# Patient Record
Sex: FEMALE | Race: WHITE | HISPANIC OR LATINO | Employment: OTHER | URBAN - METROPOLITAN AREA
[De-identification: names, ages, dates, MRNs, and addresses within clinical notes are randomized per-mention and may not be internally consistent; named-entity substitution may affect disease eponyms.]

---

## 2021-03-10 ENCOUNTER — APPOINTMENT (EMERGENCY)
Dept: CT IMAGING | Facility: HOSPITAL | Age: 36
End: 2021-03-10
Payer: COMMERCIAL

## 2021-03-10 ENCOUNTER — HOSPITAL ENCOUNTER (OUTPATIENT)
Facility: HOSPITAL | Age: 36
Setting detail: OBSERVATION
Discharge: HOME | End: 2021-03-11
Attending: EMERGENCY MEDICINE | Admitting: INTERNAL MEDICINE
Payer: COMMERCIAL

## 2021-03-10 DIAGNOSIS — R76.8 HEPATITIS C ANTIBODY TEST POSITIVE: ICD-10-CM

## 2021-03-10 DIAGNOSIS — R17 JAUNDICE: ICD-10-CM

## 2021-03-10 DIAGNOSIS — R74.01 TRANSAMINITIS: Primary | ICD-10-CM

## 2021-03-10 DIAGNOSIS — B15.9 ACUTE HEPATITIS A: ICD-10-CM

## 2021-03-10 DIAGNOSIS — F19.90 DRUG USE: ICD-10-CM

## 2021-03-10 DIAGNOSIS — K75.9 HEPATITIS: ICD-10-CM

## 2021-03-10 LAB
ALBUMIN SERPL BCP-MCNC: 4 G/DL (ref 3–5.2)
ALP SERPL-CCNC: 251 U/L (ref 43–122)
ALT SERPL W P-5'-P-CCNC: 985 U/L (ref 9–52)
AMPHETAMINES SERPL QL SCN: NEGATIVE
ANION GAP SERPL CALCULATED.3IONS-SCNC: 7 MMOL/L (ref 5–14)
APAP SERPL-MCNC: <10 UG/ML (ref 10–20)
APTT PPP: 35 SECONDS (ref 23–37)
AST SERPL W P-5'-P-CCNC: 798 U/L (ref 14–36)
BARBITURATES UR QL: NEGATIVE
BASOPHILS # BLD AUTO: 0.1 THOUSANDS/ΜL (ref 0–0.1)
BASOPHILS NFR BLD AUTO: 1 % (ref 0–1)
BENZODIAZ UR QL: NEGATIVE
BILIRUB SERPL-MCNC: 5.1 MG/DL
BILIRUB UR QL STRIP: NEGATIVE
BUN SERPL-MCNC: 10 MG/DL (ref 5–25)
CALCIUM SERPL-MCNC: 8.8 MG/DL (ref 8.4–10.2)
CHLORIDE SERPL-SCNC: 95 MMOL/L (ref 97–108)
CK SERPL-CCNC: <20 U/L (ref 30–135)
CLARITY UR: CLEAR
CO2 SERPL-SCNC: 34 MMOL/L (ref 22–30)
COCAINE UR QL: NEGATIVE
COLOR UR: ABNORMAL
CREAT SERPL-MCNC: 0.88 MG/DL (ref 0.6–1.2)
D DIMER PPP FEU-MCNC: 2.36 UG/ML FEU
EOSINOPHIL # BLD AUTO: 0.1 THOUSAND/ΜL (ref 0–0.4)
EOSINOPHIL NFR BLD AUTO: 1 % (ref 0–6)
ERYTHROCYTE [DISTWIDTH] IN BLOOD BY AUTOMATED COUNT: 14.4 %
EXT PREG TEST URINE: NEGATIVE
EXT. CONTROL ED NAV: NORMAL
GFR SERPL CREATININE-BSD FRML MDRD: 84 ML/MIN/1.73SQ M
GLUCOSE SERPL-MCNC: 112 MG/DL (ref 70–99)
GLUCOSE UR STRIP-MCNC: NEGATIVE MG/DL
HCT VFR BLD AUTO: 40.2 % (ref 36–46)
HGB BLD-MCNC: 13.2 G/DL (ref 12–16)
HGB UR QL STRIP.AUTO: NEGATIVE
INR PPP: 1.12 (ref 0.84–1.19)
KETONES UR STRIP-MCNC: NEGATIVE MG/DL
LEUKOCYTE ESTERASE UR QL STRIP: NEGATIVE
LIPASE SERPL-CCNC: 410 U/L (ref 23–300)
LYMPHOCYTES # BLD AUTO: 2.3 THOUSANDS/ΜL (ref 0.5–4)
LYMPHOCYTES NFR BLD AUTO: 30 % (ref 25–45)
MAGNESIUM SERPL-MCNC: 2.4 MG/DL (ref 1.6–2.3)
MCH RBC QN AUTO: 27.9 PG (ref 26–34)
MCHC RBC AUTO-ENTMCNC: 32.7 G/DL (ref 31–36)
MCV RBC AUTO: 85 FL (ref 80–100)
METHADONE UR QL: NEGATIVE
MONOCYTES # BLD AUTO: 0.6 THOUSAND/ΜL (ref 0.2–0.9)
MONOCYTES NFR BLD AUTO: 8 % (ref 1–10)
NEUTROPHILS # BLD AUTO: 4.5 THOUSANDS/ΜL (ref 1.8–7.8)
NEUTS SEG NFR BLD AUTO: 59 % (ref 45–65)
NITRITE UR QL STRIP: NEGATIVE
OPIATES UR QL SCN: NEGATIVE
OXYCODONE+OXYMORPHONE UR QL SCN: NEGATIVE
PCP UR QL: NEGATIVE
PH UR STRIP.AUTO: 7 [PH]
PHOSPHATE SERPL-MCNC: 3.4 MG/DL (ref 2.5–4.8)
PLATELET # BLD AUTO: 371 THOUSANDS/UL (ref 150–450)
PMV BLD AUTO: 9.3 FL (ref 8.9–12.7)
POTASSIUM SERPL-SCNC: 3.8 MMOL/L (ref 3.6–5)
PROT SERPL-MCNC: 8.8 G/DL (ref 5.9–8.4)
PROT UR STRIP-MCNC: NEGATIVE MG/DL
PROTHROMBIN TIME: 14.5 SECONDS (ref 11.6–14.5)
RBC # BLD AUTO: 4.72 MILLION/UL (ref 4–5.2)
SODIUM SERPL-SCNC: 136 MMOL/L (ref 137–147)
SP GR UR STRIP.AUTO: 1.01 (ref 1–1.04)
THC UR QL: NEGATIVE
UROBILINOGEN UA: 1 MG/DL
WBC # BLD AUTO: 7.6 THOUSAND/UL (ref 4.5–11)

## 2021-03-10 PROCEDURE — 71275 CT ANGIOGRAPHY CHEST: CPT

## 2021-03-10 PROCEDURE — 99220 PR INITIAL OBSERVATION CARE/DAY 70 MINUTES: CPT | Performed by: INTERNAL MEDICINE

## 2021-03-10 PROCEDURE — 84100 ASSAY OF PHOSPHORUS: CPT | Performed by: EMERGENCY MEDICINE

## 2021-03-10 PROCEDURE — 85025 COMPLETE CBC W/AUTO DIFF WBC: CPT | Performed by: EMERGENCY MEDICINE

## 2021-03-10 PROCEDURE — 85730 THROMBOPLASTIN TIME PARTIAL: CPT | Performed by: EMERGENCY MEDICINE

## 2021-03-10 PROCEDURE — 74177 CT ABD & PELVIS W/CONTRAST: CPT

## 2021-03-10 PROCEDURE — 81025 URINE PREGNANCY TEST: CPT

## 2021-03-10 PROCEDURE — 85379 FIBRIN DEGRADATION QUANT: CPT | Performed by: EMERGENCY MEDICINE

## 2021-03-10 PROCEDURE — 80143 DRUG ASSAY ACETAMINOPHEN: CPT | Performed by: EMERGENCY MEDICINE

## 2021-03-10 PROCEDURE — 83735 ASSAY OF MAGNESIUM: CPT | Performed by: EMERGENCY MEDICINE

## 2021-03-10 PROCEDURE — 36415 COLL VENOUS BLD VENIPUNCTURE: CPT | Performed by: EMERGENCY MEDICINE

## 2021-03-10 PROCEDURE — 80053 COMPREHEN METABOLIC PANEL: CPT | Performed by: EMERGENCY MEDICINE

## 2021-03-10 PROCEDURE — 80074 ACUTE HEPATITIS PANEL: CPT | Performed by: EMERGENCY MEDICINE

## 2021-03-10 PROCEDURE — 96365 THER/PROPH/DIAG IV INF INIT: CPT

## 2021-03-10 PROCEDURE — 96366 THER/PROPH/DIAG IV INF ADDON: CPT

## 2021-03-10 PROCEDURE — 85610 PROTHROMBIN TIME: CPT | Performed by: EMERGENCY MEDICINE

## 2021-03-10 PROCEDURE — 80307 DRUG TEST PRSMV CHEM ANLYZR: CPT | Performed by: EMERGENCY MEDICINE

## 2021-03-10 PROCEDURE — G1004 CDSM NDSC: HCPCS

## 2021-03-10 PROCEDURE — 81003 URINALYSIS AUTO W/O SCOPE: CPT | Performed by: EMERGENCY MEDICINE

## 2021-03-10 PROCEDURE — 82550 ASSAY OF CK (CPK): CPT | Performed by: EMERGENCY MEDICINE

## 2021-03-10 PROCEDURE — 96375 TX/PRO/DX INJ NEW DRUG ADDON: CPT

## 2021-03-10 PROCEDURE — 99285 EMERGENCY DEPT VISIT HI MDM: CPT

## 2021-03-10 PROCEDURE — 99284 EMERGENCY DEPT VISIT MOD MDM: CPT | Performed by: EMERGENCY MEDICINE

## 2021-03-10 PROCEDURE — 83690 ASSAY OF LIPASE: CPT | Performed by: EMERGENCY MEDICINE

## 2021-03-10 RX ORDER — MAGNESIUM HYDROXIDE/ALUMINUM HYDROXICE/SIMETHICONE 120; 1200; 1200 MG/30ML; MG/30ML; MG/30ML
30 SUSPENSION ORAL EVERY 6 HOURS PRN
Status: DISCONTINUED | OUTPATIENT
Start: 2021-03-10 | End: 2021-03-11 | Stop reason: HOSPADM

## 2021-03-10 RX ORDER — ALBUTEROL SULFATE 90 UG/1
2 AEROSOL, METERED RESPIRATORY (INHALATION) EVERY 4 HOURS PRN
Status: DISCONTINUED | OUTPATIENT
Start: 2021-03-10 | End: 2021-03-11 | Stop reason: HOSPADM

## 2021-03-10 RX ORDER — KETOROLAC TROMETHAMINE 30 MG/ML
15 INJECTION, SOLUTION INTRAMUSCULAR; INTRAVENOUS ONCE
Status: COMPLETED | OUTPATIENT
Start: 2021-03-10 | End: 2021-03-10

## 2021-03-10 RX ORDER — ONDANSETRON 2 MG/ML
4 INJECTION INTRAMUSCULAR; INTRAVENOUS EVERY 6 HOURS PRN
Status: DISCONTINUED | OUTPATIENT
Start: 2021-03-10 | End: 2021-03-11 | Stop reason: HOSPADM

## 2021-03-10 RX ORDER — CLONAZEPAM 1 MG/1
1 TABLET ORAL DAILY
Status: DISCONTINUED | OUTPATIENT
Start: 2021-03-10 | End: 2021-03-11 | Stop reason: HOSPADM

## 2021-03-10 RX ORDER — CLONIDINE HYDROCHLORIDE 0.1 MG/1
0.2 TABLET ORAL 3 TIMES DAILY PRN
Status: DISCONTINUED | OUTPATIENT
Start: 2021-03-10 | End: 2021-03-11 | Stop reason: HOSPADM

## 2021-03-10 RX ORDER — ZOLPIDEM TARTRATE 6.25 MG/1
6.25 TABLET, FILM COATED, EXTENDED RELEASE ORAL
COMMUNITY
End: 2021-03-25

## 2021-03-10 RX ORDER — CLONAZEPAM 1 MG/1
1 TABLET ORAL DAILY
COMMUNITY

## 2021-03-10 RX ORDER — ZOLPIDEM TARTRATE 5 MG/1
5 TABLET ORAL
Status: DISCONTINUED | OUTPATIENT
Start: 2021-03-10 | End: 2021-03-11 | Stop reason: HOSPADM

## 2021-03-10 RX ORDER — METHADONE HYDROCHLORIDE 10 MG/1
40 TABLET ORAL DAILY
Status: DISCONTINUED | OUTPATIENT
Start: 2021-03-10 | End: 2021-03-11 | Stop reason: HOSPADM

## 2021-03-10 RX ORDER — ACETAMINOPHEN 325 MG/1
650 TABLET ORAL EVERY 6 HOURS PRN
Status: DISCONTINUED | OUTPATIENT
Start: 2021-03-10 | End: 2021-03-11 | Stop reason: HOSPADM

## 2021-03-10 RX ADMIN — CLONAZEPAM 1 MG: 1 TABLET ORAL at 16:35

## 2021-03-10 RX ADMIN — IOHEXOL 100 ML: 350 INJECTION, SOLUTION INTRAVENOUS at 12:22

## 2021-03-10 RX ADMIN — SODIUM CHLORIDE, SODIUM LACTATE, POTASSIUM CHLORIDE, AND CALCIUM CHLORIDE 1000 ML: .6; .31; .03; .02 INJECTION, SOLUTION INTRAVENOUS at 10:33

## 2021-03-10 RX ADMIN — KETOROLAC TROMETHAMINE 15 MG: 30 INJECTION, SOLUTION INTRAMUSCULAR; INTRAVENOUS at 11:26

## 2021-03-10 RX ADMIN — ZOLPIDEM TARTRATE 5 MG: 5 TABLET, FILM COATED ORAL at 21:34

## 2021-03-10 RX ADMIN — METHADONE HYDROCHLORIDE 40 MG: 10 TABLET ORAL at 16:34

## 2021-03-10 NOTE — ASSESSMENT & PLAN NOTE
· Suspect viral hepatitis given IV drug abuse   · Scleral icterus noted on exam  · Transaminitis in the 700-900  · Total bilirubin 5  · Right upper quadrant tenderness on exam  · CT scan Mild intrahepatic bile duct prominence likely secondary to cholecystectomy no evidence of CBD stone  · Consult gastroenterology  · Follow-up viral hepatitis panel  · Check hepatic function with BMP in the morning

## 2021-03-10 NOTE — H&P
[facility]  H&P- Afsaneh Martin 1985, 38 y o  female MRN: [id number]  Unit/Bed#: 7T [facility] 708-02 Encounter: [id number]  Primary Care Provider: No primary care provider on file  Date and time admitted to hospital: 3/10/2021 10:18 AM    * Jaundice  Assessment & Plan  · Suspect viral hepatitis given IV drug abuse   · Scleral icterus noted on exam  · Transaminitis in the 700-900  · Total bilirubin 5  · Right upper quadrant tenderness on exam  · CT scan Mild intrahepatic bile duct prominence likely secondary to cholecystectomy no evidence of CBD stone  · Consult gastroenterology  · Follow-up viral hepatitis panel  · Check hepatic function with BMP in the morning      Heroin abuse ([facility] 75 )  Assessment & Plan  · Reported using 11 bags of heroin IV last use this morning  She reported using clean needles only her  needles  She mix it with fentanyl she was clean for the past 13 years until 1 year ago  She requested methadone add as it help in the past   · Start methadone 40 mg for opioid withdrawal  · Clonidine for withdrawal symptoms  · Offered inpatient detox declined  · Counseling for drug cessation    Asthma  Assessment & Plan  Albuterol as needed for dyspnea or wheezing  Von Willebrand disease ([facility] 75 )  Assessment & Plan  Avoid DVT prophylaxis with pharmacologic therapy  Avoid NSAIDs such as ibuprofen  Anxiety  Assessment & Plan  Resume pre-hospital Klonopin  VTE Prophylaxis: Pharmacologic VTE Prophylaxis contraindicated due to VWD  / sequential compression device   Code Status: Full   POLST: POLST form is not discussed and not completed at this time  Discussion with family:  Discussed with patient  Anticipated Length of Stay:  Patient will be admitted on an Observation basis with an anticipated length of stay of  Less than 2 midnights  Justification for Hospital Stay: Jaundice    Total Time for Visit, including Counseling / Coordination of Care: 45 minutes    Greater than 50% of this total time spent on direct patient counseling and coordination of care  Chief Complaint:   Yellowish eyes and skin    History of Present Illness:    Verner Martinis is a 38 y o  female unemployed, adopted who presents with past medical history of IV heroin abuse, von Willebrand's disease, asthma and anxiety complaining of yellowish discoloration of her eyes and skin for the past 2 days  She has been feeling weak for the past week associated with decreased appetite, nausea without vomiting  She noticed her urine very dark yellow  For the past 2 days her skin was getting yellow noted by her finance a as well as her eyes associated with sharp pain bilateral flanks upon taking deep breath  She has been injecting heroin however uses clean needles only does not share needles  She inject about 11 packs prior to admission mixed with fentanyl  She has been free from drugs for 13 years until 2 years ago  She lives with her finance a who has hepatitis C last sexual intercourse was a month ago  She is monogamous in her relationship  Upon arrival to the ED her labs show significant transaminitis AST in the 700 and ALT in the 900 with alk-phos in 250s and total prolonged been in the 5  CT chest abdomen pelvis PE study was negative for PE, concern for colitis, L5-S1 disc herniation and 1 cm low-density lesion in the right uterus recommended pelvic ultrasound non emergent  Review of Systems:    Review of Systems   Constitutional: Positive for appetite change and fatigue  Negative for chills and fever  HENT: Negative for rhinorrhea and sore throat  Eyes: Negative for pain and visual disturbance  Respiratory: Negative for cough and shortness of breath  Cardiovascular: Negative for chest pain and palpitations  Gastrointestinal: Negative for abdominal pain, nausea and vomiting  Genitourinary: Negative for difficulty urinating and dysuria     Musculoskeletal: Negative for back pain and joint swelling  Skin: Positive for color change (Yellowish)  Negative for rash  Neurological: Negative for numbness and headaches  Psychiatric/Behavioral: Negative for confusion and hallucinations  All other systems reviewed and are negative  Past Medical and Surgical History:     Past Medical History:   Diagnosis Date    Anxiety     Asthma     [facility]venous [facility] abuse ([email or web address]     Von Willebrand disease ([email or web address] 75 )        Past Surgical History:   Procedure Laterality Date    APPENDECTOMY      CHOLECYSTECTOMY      PARTIAL HYSTERECTOMY         Meds/Allergies:    Prior to Admission medications    Medication Sig Start Date End Date Taking? Authorizing Provider   clonazePAM (KlonoPIN) 1 mg tablet Take 1 mg by mouth daily    Yes Historical Provider, MD   zolpidem (AMBIEN CR) 6 25 MG CR tablet Take 6 25 mg by mouth daily at bedtime as needed for sleep   Yes Historical Provider, MD     I have reviewed home medications with patient personally  Allergies: No Known Allergies    Social History:     Marital Status: Single   Occupation:  Unemployed on disability  Patient Pre-hospital Living Situation:  In apartment with finance a  Patient Pre-hospital Level of Mobility:  Independent  Patient Pre-hospital Diet Restrictions:  No restrictions    Substance Use History:   Social History     Substance and Sexual Activity   Alcohol Use Never    Frequency: Never     Social History     Tobacco Use   Smoking Status Never Smoker   Smokeless Tobacco Never Used     Social History     Substance and Sexual Activity   Drug Use Yes    Types: Fentanyl, Heroin    Comment: "shoots 11 bags a day"       Family History:    non-contributory    Physical Exam:     Vitals:   Blood Pressure: 101/77 (03/10/21 1511)  Pulse: 83 (03/10/21 1511)  Temperature: (!) 96 8 °F (36 °C) (03/10/21 1511)  Temp Source: Skin (03/10/21 1511)  Respirations: 16 (03/10/21 1511)  Height: 5' 2" (157 5 cm) (03/10/21 1511)  Weight - Scale: 77 2 kg (170 lb 3 1 oz) (03/10/21 1511)  SpO2: 98 % (03/10/21 1511)    Physical Exam  Vitals signs reviewed  Constitutional:       General: She is not in acute distress  Appearance: She is obese  She is not ill-appearing, toxic-appearing or diaphoretic  HENT:      Head: Normocephalic and atraumatic  Nose: No rhinorrhea  Mouth/Throat:      Pharynx: Oropharynx is clear  No oropharyngeal exudate or posterior oropharyngeal erythema  Eyes:      General: Scleral icterus present  Right eye: No discharge  Left eye: No discharge  Neck:      Musculoskeletal: Neck supple  No neck rigidity  Cardiovascular:      Rate and Rhythm: Normal rate and regular rhythm  Heart sounds: Normal heart sounds  No murmur  Pulmonary:      Effort: Pulmonary effort is normal  No respiratory distress  Breath sounds: Normal breath sounds  No wheezing or rales  Abdominal:      General: Bowel sounds are normal  There is no distension  Palpations: Abdomen is soft  Tenderness: There is abdominal tenderness (RUQ)  Musculoskeletal:      Right lower leg: No edema  Left lower leg: No edema  Skin:     General: Skin is dry  Coloration: Skin is jaundiced  Neurological:      General: No focal deficit present  Mental Status: She is alert and oriented to person, place, and time  Cranial Nerves: No cranial nerve deficit  Psychiatric:         Mood and Affect: Mood normal          Behavior: Behavior normal              Additional Data:     Lab Results: I have personally reviewed pertinent reports        Results from last 7 days   Lab Units 03/10/21  1032   WBC Thousand/uL 7 60   HEMOGLOBIN g/dL 13 2   HEMATOCRIT % 40 2   PLATELETS Thousands/uL 371   NEUTROS PCT % 59   LYMPHS PCT % 30   MONOS PCT % 8   EOS PCT % 1     Results from last 7 days   Lab Units 03/10/21  1032   SODIUM mmol/L 136*   POTASSIUM mmol/L 3 8   CHLORIDE mmol/L 95*   CO2 mmol/L 34*   BUN mg/dL 10   CREATININE mg/dL 0 88   ANION GAP mmol/L 7   CALCIUM mg/dL 8 8   ALBUMIN g/dL 4 0   TOTAL BILIRUBIN mg/dL 5 10*   ALK PHOS U/L 251*   ALT U/L 985*   AST U/L 798*   GLUCOSE RANDOM mg/dL 112*     Results from last 7 days   Lab Units 03/10/21  1032   INR  1 12                   Imaging: I have personally reviewed pertinent reports  PE Study with CT Abdomen and Pelvis with contrast   Final Result by Bob Levine MD (03/10 1300)      Unremarkable chest      Mild inflammation along both sides of the colon with small amount of free fluid in the left side of abdomen near the colon and also in the pelvis  Findings are nonspecific but may suggest a colitis  1 cm indeterminate low-density lesion in the right side of the uterus which might be further assessed with nonemergent follow-up pelvic ultrasound  L5-S1 disc herniation is suggested in the posterior midline extending behind L5 vertebral body with some central canal narrowing noted  Consider follow-up MRI of the lumbar spine on a nonemergent basis for better characterization  The study was marked in [facility] for immediate notification  Workstation performed: [id number]             EKG, Pathology, and Other Studies Reviewed on Admission:       Allscripts / Epic Records Reviewed: Yes     ** Please Note: This note has been constructed using a voice recognition system   **

## 2021-03-10 NOTE — ED PROVIDER NOTES
History  Chief Complaint   Patient presents with    Abdominal Pain     states urine dark for over 1 week now with pain on both sides of abdomen; feeling low energy and weak; states no appetiate  History provided by:  Patient  Abdominal Pain  Pain location:  Epigastric  Pain quality: aching    Pain radiates to:  Does not radiate  Pain severity:  Moderate  Onset quality:  Gradual  Timing:  Constant  Progression:  Worsening  Relieved by:  Nothing  Worsened by:  Nothing  Ineffective treatments:  None tried  Associated symptoms: no chest pain, no chills, no cough, no diarrhea, no dysuria, no fever, no hematuria, no nausea, no shortness of breath, no sore throat and no vomiting        Prior to Admission Medications   Prescriptions Last Dose Informant Patient Reported? Taking? clonazePAM (KlonoPIN) 1 mg tablet   Yes Yes   Sig: Take 1 mg by mouth daily as needed for seizures   zolpidem (AMBIEN CR) 6 25 MG CR tablet   Yes Yes   Sig: Take 6 25 mg by mouth daily at bedtime as needed for sleep      Facility-Administered Medications: None       Past Medical History:   Diagnosis Date    Asthma     Von Willebrand disease ([facility] [address] [facility] )        History reviewed  No pertinent surgical history  History reviewed  No pertinent family history  I have reviewed and agree with the history as documented  E-Cigarette/Vaping    E-Cigarette Use Never User      E-Cigarette/Vaping Substances     Social History     Tobacco Use    Smoking status: Never Smoker    Smokeless tobacco: Never Used   Substance Use Topics    Alcohol use: Never     Frequency: Never    Drug use: Yes     Types: Fentanyl, Heroin       Review of Systems   Constitutional: Negative for chills and fever  HENT: Negative for ear pain, rhinorrhea, sore throat and trouble swallowing  Eyes: Negative for pain and visual disturbance  Respiratory: Negative for cough, shortness of breath, wheezing and stridor      Cardiovascular: Negative for chest pain, palpitations and leg swelling  Gastrointestinal: Positive for abdominal pain  Negative for diarrhea, nausea and vomiting  Endocrine: Negative for polyuria  Genitourinary: Negative for dysuria, flank pain, hematuria and urgency  Musculoskeletal: Negative for arthralgias, back pain, joint swelling, myalgias and neck stiffness  Skin: Negative for color change and rash  Allergic/Immunologic: Negative for immunocompromised state  Neurological: Negative for dizziness, seizures, syncope, weakness, numbness and headaches  Psychiatric/Behavioral: Negative for confusion and suicidal ideas  All other systems reviewed and are negative  Physical Exam  Physical Exam  Vitals signs and nursing note reviewed  Constitutional:       Appearance: Normal appearance  She is well-developed  HENT:      Head: Normocephalic and atraumatic  Nose: Nose normal       Mouth/Throat:      Mouth: Mucous membranes are moist    Eyes:      Extraocular Movements: Extraocular movements intact  Pupils: Pupils are equal, round, and reactive to light  Neck:      Musculoskeletal: Normal range of motion and neck supple  Cardiovascular:      Rate and Rhythm: Normal rate and regular rhythm  Heart sounds: No murmur  No friction rub  Pulmonary:      Effort: No respiratory distress  Breath sounds: Normal breath sounds  No wheezing or rales  Abdominal:      General: Bowel sounds are normal  There is no distension  Palpations: Abdomen is soft  Tenderness: There is abdominal tenderness in the epigastric area  Musculoskeletal: Normal range of motion  General: No tenderness  Skin:     General: Skin is warm  Coloration: Skin is jaundiced  Findings: No rash  Neurological:      Mental Status: She is alert and oriented to person, place, and time     Psychiatric:         Mood and Affect: Mood normal          Vital Signs  ED Triage Vitals [03/10/21 1013]   Temperature Pulse Respirations Blood Pressure SpO2   (!) 96 7 °F (35 9 °C) 90 16 108/69 99 %      Temp Source Heart Rate Source Patient Position - Orthostatic VS BP Location FiO2 (%)   Tympanic Monitor Sitting Left arm --      Pain Score       8           Vitals:    03/10/21 1013 03/10/21 1239 03/10/21 1511   BP: 108/69 106/70 101/77   Pulse: 90 86 83   Patient Position - Orthostatic VS: Sitting Lying Sitting         Visual Acuity      ED Medications  Medications   lactated ringers bolus 1,000 mL (0 mL Intravenous Stopped 3/10/21 1242)   ketorolac (TORADOL) injection 15 mg (15 mg Intravenous Given 3/10/21 1126)   iohexol (OMNIPAQUE) 350 MG/ML injection (MULTI-DOSE) 100 mL (100 mL Intravenous Given 3/10/21 1222)       Diagnostic Studies  Results Reviewed     Procedure Component Value Units Date/Time    Rapid drug screen, urine [[id number]]  (Normal) Collected: 03/10/21 1320    Lab Status: Final result Specimen: Urine, Clean Catch Updated: 03/10/21 1350     Amph/Meth UR Negative     Barbiturate Ur Negative     Benzodiazepine Urine Negative     Cocaine Urine Negative     Methadone Urine Negative     Opiate Urine Negative     PCP Ur Negative     THC Urine Negative     Oxycodone Urine Negative    Narrative:      FOR MEDICAL PURPOSES ONLY  IF CONFIRMATION NEEDED PLEASE CONTACT THE LAB WITHIN 5 DAYS      Drug Screen Cutoff Levels:  AMPHETAMINE/METHAMPHETAMINES  1000 ng/mL  BARBITURATES     200 ng/mL  BENZODIAZEPINES     200 ng/mL  COCAINE      300 ng/mL  METHADONE      300 ng/mL  OPIATES      300 ng/mL  PHENCYCLIDINE     25 ng/mL  THC       50 ng/mL  OXYCODONE      100 ng/mL    UA w Reflex to Microscopic w Reflex to Culture [[id number]]  (Abnormal) Collected: 03/10/21 1320    Lab Status: Final result Specimen: Urine, Clean Catch Updated: 03/10/21 1340     Color, UA Ruthann     Clarity, UA Clear     Specific Gravity, UA 1 010     pH, UA 7 0     Leukocytes, UA Negative     Nitrite, UA Negative     Protein, UA Negative mg/dl      Glucose, UA Negative mg/dl      Ketones, UA Negative mg/dl      Bilirubin, UA Negative     Blood, UA Negative     UROBILINOGEN UA 1 0 mg/dL     Acetaminophen level-If concentration is detectable, please discuss with medical  on call  [[id number]]  (Abnormal) Collected: 03/10/21 1032    Lab Status: Final result Specimen: Blood from Arm, Left Updated: 03/10/21 1203     Acetaminophen Level <10 ug/mL     Hepatitis panel, acute [[id number]] Collected: 03/10/21 1125    Lab Status:  In process Specimen: Blood from Arm, Left Updated: 03/10/21 1201    D-Dimer [[id number]]  (Abnormal) Collected: 03/10/21 1032    Lab Status: Final result Specimen: Blood from Arm, Left Updated: 03/10/21 1129     D-Dimer, Quant 2 36 ug/ml FEU     POCT pregnancy, urine [[id number]]  (Normal) Resulted: 03/10/21 1112    Lab Status: Final result Specimen: Urine Updated: 03/10/21 1113     EXT PREG TEST UR (Ref: Negative) Negative     Control Valid    Phosphorus [[id number]]  (Normal) Collected: 03/10/21 1032    Lab Status: Final result Specimen: Blood from Arm, Left Updated: 03/10/21 1106     Phosphorus 3 4 mg/dL     Magnesium [[id number]]  (Abnormal) Collected: 03/10/21 1032    Lab Status: Final result Specimen: Blood from Arm, Left Updated: 03/10/21 1106     Magnesium 2 4 mg/dL     CK Total with Reflex CKMB [[id number]]  (Abnormal) Collected: 03/10/21 1032    Lab Status: Final result Specimen: Blood from Arm, Left Updated: 03/10/21 1106     Total CK <20 U/L     Lipase [[id number]]  (Abnormal) Collected: 03/10/21 1032    Lab Status: Final result Specimen: Blood from Arm, Left Updated: 03/10/21 1106     Lipase 410 u/L     Comprehensive metabolic panel [[id number]]  (Abnormal) Collected: 03/10/21 1032    Lab Status: Final result Specimen: Blood from Arm, Left Updated: 03/10/21 1105     Sodium 136 mmol/L      Potassium 3 8 mmol/L      Chloride 95 mmol/L      CO2 34 mmol/L      ANION GAP 7 mmol/L      BUN 10 mg/dL      Creatinine 0 88 mg/dL      Glucose 112 mg/dL      Calcium 8 8 mg/dL      AST 798 U/L       U/L      Alkaline Phosphatase 251 U/L      Total Protein 8 8 g/dL      Albumin 4 0 g/dL      Total Bilirubin 5 10 mg/dL      eGFR 84 ml/min/1 73sq m     Narrative:      [place] guidelines for Chronic Kidney Disease (CKD):     Stage 1 with normal or high GFR (GFR > 90 mL/min/1 73 square meters)    Stage 2 Mild CKD (GFR = 60-89 mL/min/1 73 square meters)    Stage 3A Moderate CKD (GFR = 45-59 mL/min/1 73 square meters)    Stage 3B Moderate CKD (GFR = 30-44 mL/min/1 73 square meters)    Stage 4 Severe CKD (GFR = 15-29 mL/min/1 73 square meters)    Stage 5 End Stage CKD (GFR <15 mL/min/1 73 square meters)  Note: GFR calculation is accurate only with a steady state creatinine    Protime-INR [[id number]]  (Normal) Collected: 03/10/21 1032    Lab Status: Final result Specimen: Blood from Arm, Left Updated: 03/10/21 1058     Protime 14 5 seconds      INR 1 12    APTT [[id number]]  (Normal) Collected: 03/10/21 1032    Lab Status: Final result Specimen: Blood from Arm, Left Updated: 03/10/21 1058     PTT 35 seconds     CBC and differential [[id number]]  (Normal) Collected: 03/10/21 1032    Lab Status: Final result Specimen: Blood from Arm, Left Updated: 03/10/21 1057     WBC 7 60 Thousand/uL      RBC 4 72 Million/uL      Hemoglobin 13 2 g/dL      Hematocrit 40 2 %      MCV 85 fL      MCH 27 9 pg      MCHC 32 7 g/dL      RDW 14 4 %      MPV 9 3 fL      Platelets 534 Thousands/uL      Neutrophils Relative 59 %      Lymphocytes Relative 30 %      Monocytes Relative 8 %      Eosinophils Relative 1 %      Basophils Relative 1 %      Neutrophils Absolute 4 50 Thousands/µL      Lymphocytes Absolute 2 30 Thousands/µL      Monocytes Absolute 0 60 Thousand/µL      Eosinophils Absolute 0 10 Thousand/µL      Basophils Absolute 0 10 Thousands/µL                  PE Study with CT Abdomen and Pelvis with contrast   Final Result by Xochilt Turner MD (03/10 1300)      Unremarkable chest Mild inflammation along both sides of the colon with small amount of free fluid in the left side of abdomen near the colon and also in the pelvis  Findings are nonspecific but may suggest a colitis  1 cm indeterminate low-density lesion in the right side of the uterus which might be further assessed with nonemergent follow-up pelvic ultrasound  L5-S1 disc herniation is suggested in the posterior midline extending behind L5 vertebral body with some central canal narrowing noted  Consider follow-up MRI of the lumbar spine on a nonemergent basis for better characterization  The study was marked in [facility] for immediate notification  Workstation performed: [id number]                    Procedures  Procedures         ED Course  ED Course as of Mar 10 1545   Wed Mar 10, 2021   1122 Transaminitis noted will add on acetaminophen level awaiting D-dimer, possibility of hepatitis given IV drug use      1137 Significantly elevated D-dimer at this point time will get a CTA PE for chest as well as abdomen pelvis      1304 Transaminase noted acetaminophen normal          1308 Patient noted to jason  1310 AST(!): 798   1310 ALT(!): 985   1310 Alkaline Phosphatase(!): 251   1310 TOTAL BILIRUBIN(!): 5 10   1310 ACETAMINOPHEN LEVEL(!): <10   1312 Spoke with medicine at this time,       26 Spoke with Dr Hali Michaud, likely viral hepatitis  MDM  Number of Diagnoses or Management Options  Drug use: new and requires workup  Hepatitis: new and requires workup  Jaundice: new and requires workup  Transaminitis: new and requires workup  Diagnosis management comments: 67-year-old female presents emergency department with noted under rib discomfort and pain with breathing D-dimer positive transaminitis positive    Concern for possible viral hepatitis, patient had history of cholecystectomy in the past   No concern for common bile duct stone with noted CT scan     Patient agrees for admission       Amount and/or Complexity of Data Reviewed  Clinical lab tests: reviewed and ordered  Tests in the radiology section of CPT®: ordered and reviewed  Review and summarize past medical records: yes  Independent visualization of images, tracings, or specimens: yes        Disposition  Final diagnoses:   Transaminitis   Hepatitis   Drug use   Jaundice     Time reflects when diagnosis was documented in both MDM as applicable and the Disposition within this note     Time User Action Codes Description Comment    3/10/2021  2:06 PM Trinh Olp Add [R74 01] Transaminitis     3/10/2021  2:06 PM Wykristenia Citron R Add [K75 9] Hepatitis     3/10/2021  2:06 PM Trinh Olp Add [F19 90] Drug use     3/10/2021  2:06 PM Trinh Olp Add [R17] Jaundice       ED Disposition     ED Disposition Condition Date/Time Comment    Admit Stable Wed Mar 10, 2021  2:06 PM       Follow-up Information    None         Current Discharge Medication List      CONTINUE these medications which have NOT CHANGED    Details   clonazePAM (KlonoPIN) 1 mg tablet Take 1 mg by mouth daily as needed for seizures      zolpidem (AMBIEN CR) 6 25 MG CR tablet Take 6 25 mg by mouth daily at bedtime as needed for sleep           No discharge procedures on file      PDMP Review     None          ED Provider  Electronically Signed by           Taylor Owens DO  03/10/21 4521

## 2021-03-10 NOTE — PLAN OF CARE
Problem: Potential for Falls  Goal: Patient will remain free of falls  Description: INTERVENTIONS:  - Assess patient frequently for physical needs  -  Identify cognitive and physical deficits and behaviors that affect risk of falls    -  [place] fall precautions as indicated by assessment   - Educate patient/family on patient safety including physical limitations  - Instruct patient to call for assistance with activity based on assessment  - Modify environment to reduce risk of injury  - Consider OT/PT consult to assist with strengthening/mobility  Outcome: Progressing     Problem: GENITOURINARY - ADULT  Goal: Maintains or returns to baseline urinary function  Description: INTERVENTIONS:  - Assess urinary function  - Encourage oral fluids to ensure adequate hydration if ordered  - Administer IV fluids as ordered to ensure adequate hydration  - Administer ordered medications as needed  - Offer frequent toileting  - Follow urinary retention protocol if ordered  Outcome: Progressing  Goal: Absence of urinary retention  Description: INTERVENTIONS:  - Assess patients ability to void and empty bladder  - Monitor I/O  - Bladder scan as needed  - Discuss with physician/AP medications to alleviate retention as needed  - Discuss catheterization for long term situations as appropriate  Outcome: Progressing  Goal: Urinary catheter remains patent  Description: INTERVENTIONS:  - Assess patency of urinary catheter  - If patient has a chronic crowder, consider changing catheter if non-functioning  - Follow guidelines for intermittent irrigation of non-functioning urinary catheter  Outcome: Progressing     Problem: METABOLIC, FLUID AND ELECTROLYTES - ADULT  Goal: Electrolytes maintained within normal limits  Description: INTERVENTIONS:  - Monitor labs and assess patient for signs and symptoms of electrolyte imbalances  - Administer electrolyte replacement as ordered  - Monitor response to electrolyte replacements, including repeat lab results as appropriate  - Instruct patient on fluid and nutrition as appropriate  Outcome: Progressing  Goal: Fluid balance maintained  Description: INTERVENTIONS:  - Monitor labs   - Monitor I/O and WT  - Instruct patient on fluid and nutrition as appropriate  - Assess for signs & symptoms of volume excess or deficit  Outcome: Progressing  Goal: Glucose maintained within target range  Description: INTERVENTIONS:  - Monitor Blood Glucose as ordered  - Assess for signs and symptoms of hyperglycemia and hypoglycemia  - Administer ordered medications to maintain glucose within target range  - Assess nutritional intake and initiate nutrition service referral as needed  Outcome: Progressing     Problem: PAIN - ADULT  Goal: Verbalizes/displays adequate comfort level or baseline comfort level  Description: Interventions:  - Encourage patient to monitor pain and request assistance  - Assess pain using appropriate pain scale  - Administer analgesics based on type and severity of pain and evaluate response  - Implement non-pharmacological measures as appropriate and evaluate response  - Consider cultural and social influences on pain and pain management  - Notify physician/advanced practitioner if interventions unsuccessful or patient reports new pain  Outcome: Progressing     Problem: INFECTION - ADULT  Goal: Absence or prevention of progression during hospitalization  Description: INTERVENTIONS:  - Assess and monitor for signs and symptoms of infection  - Monitor lab/diagnostic results  - Monitor all insertion sites, i e  indwelling lines, tubes, and drains  - Monitor endotracheal if appropriate and nasal secretions for changes in amount and color  - [place] appropriate cooling/warming therapies per order  - Administer medications as ordered  - Instruct and encourage patient and family to use good hand hygiene technique  - Identify and instruct in appropriate isolation precautions for identified infection/condition  Outcome: Progressing  Goal: Absence of fever/infection during neutropenic period  Description: INTERVENTIONS:  - Monitor WBC    Outcome: Progressing     Problem: SAFETY ADULT  Goal: Patient will remain free of falls  Description: INTERVENTIONS:  - Assess patient frequently for physical needs  -  Identify cognitive and physical deficits and behaviors that affect risk of falls    -  [place] fall precautions as indicated by assessment   - Educate patient/family on patient safety including physical limitations  - Instruct patient to call for assistance with activity based on assessment  - Modify environment to reduce risk of injury  - Consider OT/PT consult to assist with strengthening/mobility  Outcome: Progressing  Goal: Maintain or return to baseline ADL function  Description: INTERVENTIONS:  -  Assess patient's ability to carry out ADLs; assess patient's baseline for ADL function and identify physical deficits which impact ability to perform ADLs (bathing, care of mouth/teeth, toileting, grooming, dressing, etc )  - Assess/evaluate cause of self-care deficits   - Assess range of motion  - Assess patient's mobility; develop plan if impaired  - Assess patient's need for assistive devices and provide as appropriate  - Encourage maximum independence but intervene and supervise when necessary  - Involve family in performance of ADLs  - Assess for home care needs following discharge   - Consider OT consult to assist with ADL evaluation and planning for discharge  - Provide patient education as appropriate  Outcome: Progressing  Goal: Maintain or return mobility status to optimal level  Description: INTERVENTIONS:  - Assess patient's baseline mobility status (ambulation, transfers, stairs, etc )    - Identify cognitive and physical deficits and behaviors that affect mobility  - Identify mobility aids required to assist with transfers and/or ambulation (gait belt, sit-to-stand, lift, walker, cane, etc )  - [place] fall precautions as indicated by assessment  - Record patient progress and toleration of activity level on Mobility SBAR; progress patient to next Phase/Stage  - Instruct patient to call for assistance with activity based on assessment  - Consider rehabilitation consult to assist with strengthening/weightbearing, etc   Outcome: Progressing     Problem: DISCHARGE PLANNING  Goal: Discharge to home or other facility with appropriate resources  Description: INTERVENTIONS:  - Identify barriers to discharge w/patient and caregiver  - Arrange for needed discharge resources and transportation as appropriate  - Identify discharge learning needs (meds, wound care, etc )  - Arrange for interpretive services to assist at discharge as needed  - Refer to Case Management Department for coordinating discharge planning if the patient needs post-hospital services based on physician/advanced practitioner order or complex needs related to functional status, cognitive ability, or social support system  Outcome: Progressing     Problem: Knowledge Deficit  Goal: Patient/family/caregiver demonstrates understanding of disease process, treatment plan, medications, and discharge instructions  Description: Complete learning assessment and assess knowledge base    Interventions:  - Provide teaching at level of understanding  - Provide teaching via preferred learning methods  Outcome: Progressing

## 2021-03-10 NOTE — ASSESSMENT & PLAN NOTE
· Reported using 11 bags of heroin IV last use this morning  She reported using clean needles only her  needles  She mix it with fentanyl she was clean for the past 13 years until 1 year ago    She requested methadone add as it help in the past   · Start methadone 40 mg for opioid withdrawal  · Clonidine for withdrawal symptoms  · Offered inpatient detox declined  · Counseling for drug cessation

## 2021-03-11 VITALS
HEART RATE: 89 BPM | DIASTOLIC BLOOD PRESSURE: 72 MMHG | WEIGHT: 170.19 LBS | HEIGHT: 62 IN | OXYGEN SATURATION: 98 % | TEMPERATURE: 97.6 F | RESPIRATION RATE: 16 BRPM | SYSTOLIC BLOOD PRESSURE: 96 MMHG | BODY MASS INDEX: 31.32 KG/M2

## 2021-03-11 LAB
ALBUMIN SERPL BCP-MCNC: 3.6 G/DL (ref 3–5.2)
ALP SERPL-CCNC: 221 U/L (ref 43–122)
ALT SERPL W P-5'-P-CCNC: 754 U/L (ref 9–52)
ANION GAP SERPL CALCULATED.3IONS-SCNC: 5 MMOL/L (ref 5–14)
AST SERPL W P-5'-P-CCNC: 617 U/L (ref 14–36)
BILIRUB DIRECT SERPL-MCNC: 2.4 MG/DL
BILIRUB SERPL-MCNC: 3.2 MG/DL
BUN SERPL-MCNC: 10 MG/DL (ref 5–25)
CALCIUM SERPL-MCNC: 8.7 MG/DL (ref 8.4–10.2)
CHLORIDE SERPL-SCNC: 97 MMOL/L (ref 97–108)
CO2 SERPL-SCNC: 37 MMOL/L (ref 22–30)
CREAT SERPL-MCNC: 0.89 MG/DL (ref 0.6–1.2)
GFR SERPL CREATININE-BSD FRML MDRD: 83 ML/MIN/1.73SQ M
GLUCOSE SERPL-MCNC: 86 MG/DL (ref 70–99)
HAV IGM SER QL: REACTIVE
HBV CORE IGM SER QL: ABNORMAL
HBV SURFACE AG SER QL: ABNORMAL
HCV AB SER QL: ABNORMAL
HIV 1+2 AB+HIV1 P24 AG SERPL QL IA: NORMAL
HIV1 P24 AG SER QL: NORMAL
INR PPP: 1.09 (ref 0.84–1.19)
POTASSIUM SERPL-SCNC: 3.9 MMOL/L (ref 3.6–5)
PROT SERPL-MCNC: 8.1 G/DL (ref 5.9–8.4)
PROTHROMBIN TIME: 14.2 SECONDS (ref 11.6–14.5)
SODIUM SERPL-SCNC: 139 MMOL/L (ref 137–147)

## 2021-03-11 PROCEDURE — 99217 PR OBSERVATION CARE DISCHARGE MANAGEMENT: CPT | Performed by: INTERNAL MEDICINE

## 2021-03-11 PROCEDURE — 87806 HIV AG W/HIV1&2 ANTB W/OPTIC: CPT | Performed by: INTERNAL MEDICINE

## 2021-03-11 PROCEDURE — 99232 SBSQ HOSP IP/OBS MODERATE 35: CPT | Performed by: INTERNAL MEDICINE

## 2021-03-11 PROCEDURE — 85610 PROTHROMBIN TIME: CPT | Performed by: INTERNAL MEDICINE

## 2021-03-11 PROCEDURE — 80076 HEPATIC FUNCTION PANEL: CPT | Performed by: INTERNAL MEDICINE

## 2021-03-11 PROCEDURE — 80048 BASIC METABOLIC PNL TOTAL CA: CPT | Performed by: INTERNAL MEDICINE

## 2021-03-11 RX ORDER — MAGNESIUM HYDROXIDE/ALUMINUM HYDROXICE/SIMETHICONE 120; 1200; 1200 MG/30ML; MG/30ML; MG/30ML
30 SUSPENSION ORAL EVERY 6 HOURS PRN
Qty: 355 ML | Refills: 0 | Status: SHIPPED | OUTPATIENT
Start: 2021-03-11 | End: 2021-03-25

## 2021-03-11 RX ORDER — ONDANSETRON 4 MG/1
4 TABLET, ORALLY DISINTEGRATING ORAL EVERY 6 HOURS PRN
Qty: 20 TABLET | Refills: 0 | Status: SHIPPED | OUTPATIENT
Start: 2021-03-11 | End: 2021-03-25

## 2021-03-11 RX ADMIN — CLONAZEPAM 1 MG: 1 TABLET ORAL at 08:00

## 2021-03-11 RX ADMIN — METHADONE HYDROCHLORIDE 40 MG: 10 TABLET ORAL at 08:00

## 2021-03-11 RX ADMIN — ACETAMINOPHEN 650 MG: 325 TABLET, FILM COATED ORAL at 01:08

## 2021-03-11 NOTE — DISCHARGE SUMMARY
[facility]  Discharge- Freya Santos 1985, 38 y o  female MRN: [id number]  Unit/Bed#: 7T [facility] 708-02 Encounter: [id number]  Primary Care Provider: No primary care provider on file  Date and time admitted to hospital: 3/10/2021 10:18 AM    * Acute hepatitis A  Assessment & Plan  · Presented with jaundice and impaired appetite  · Acute hepatitis panel positive for IgM hepatitis-A as well as hepatitis-C low reactive  · Scleral icterus noted on exam  · Transaminitis trending down  · Total bilirubin trending down  · CT scan Mild intrahepatic bile duct prominence likely secondary to cholecystectomy no evidence of CBD stone  · Consult gastroenterology recommendation patient can be discharged home after tolerating diet, follow-up as outpatient, counseling provided for good hand washing and hygiene  · Hepatitis-C low reactive will check PCR RNA as outpatient  · Patient will be discharged home today recommendation to follow-up with PCP Clinic information provided  Heroin abuse ([place] [id number] )  Assessment & Plan  · Reported using 11 bags of heroin IV last use this morning  She reported using clean needles only her  needles  She mix it with fentanyl she was clean for the past 13 years until 1 year ago  She requested methadone add as it help in the past   ·  methadone 40 mg for opioid withdrawal  · Clonidine for withdrawal symptoms  · Offered inpatient detox declined  · Counseling for drug cessation  · Patient reported will follow up with methadone Clinic she has information for contact  Asthma  Assessment & Plan  Albuterol as needed for dyspnea or wheezing  Von Willebrand disease ([facility] )  Assessment & Plan  Avoid DVT prophylaxis with pharmacologic therapy  Avoid NSAIDs such as ibuprofen  Anxiety  Assessment & Plan  Resume pre-hospital Klonopin  Discharging Physician / Practitioner: Jose Alberto Romano MD  PCP: No primary care provider on file    Admission Date:   Admission Orders (From admission, onward)     Ordered        03/10/21 1406  Place in Observation  Once                   Discharge Date: 03/11/21    Resolved Problems  Date Reviewed: 3/11/2021    None          Consultations During Hospital Stay:  · Gastroenterology    Procedures Performed:   · CT PE study with abdomen pelvis    Significant Findings / Test Results:   No PE  Mild inflammation along both sides of the colon with small amount of free fluid in the left side of abdomen near the colon and also in the pelvis  Findings are nonspecific but may suggest a colitis  1 cm indeterminate low-density lesion in the right side of the uterus which might be further assessed with nonemergent follow-up pelvic ultrasound      L5-S1 disc herniation is suggested in the posterior midline extending behind L5 vertebral body with some central canal narrowing noted  Consider follow-up MRI of the lumbar spine on a nonemergent basis for better characterization  Incidental Findings:   · L5-S1 disc herniation is suggested in the posterior midline extending behind L5 vertebral body with some central canal narrowing noted  Consider follow-up MRI of the lumbar spine on a nonemergent basis for better characterization  Test Results Pending at Discharge (will require follow up):   · HIV      Outpatient Tests Requested:  · CMP and hepatitis C virus PCR    Complications:  None    Reason for Admission:  Acute hepatitis A  Hospital Course:     Lizy Hopkins is a 38 y o  female patient who originally presented to the hospital on 3/10/2021 due to jaundice  She was found to have acute hepatitis-A and hepatitis-C was low reactive  CT abdomen pelvis as above no pathology to explain her jaundice  Gastroenterology consulted recommended outpatient follow-up and conservative management  Patient counseled on drug use reported will contact methadone clinic for which she has the contact information      Patient understood plan of discharge and follow-up plan was in agreement  She was given information for PCP clinic to follow up and establish care as well as GI referral           Please see above list of diagnoses and related plan for additional information  Condition at Discharge: good     Discharge Day Visit / Exam:     Subjective:  Patient seen examined at bedside  She reported her appetite improved and able to tolerate diet  Vitals: Blood Pressure: 96/72 (03/11/21 0728)  Pulse: 89 (03/11/21 0728)  Temperature: 97 6 °F (36 4 °C) (03/11/21 0728)  Temp Source: Axillary (03/11/21 0728)  Respirations: 16 (03/11/21 0728)  Height: 5' 2" (157 5 cm) (03/10/21 1511)  Weight - Scale: 77 2 kg (170 lb 3 1 oz) (03/10/21 1511)  SpO2: 98 % (03/11/21 0728)  Exam:   Physical Exam  Vitals signs reviewed  Constitutional:       General: She is not in acute distress  Appearance: She is not ill-appearing, toxic-appearing or diaphoretic  HENT:      Nose: No rhinorrhea  Eyes:      General: Scleral icterus present  Right eye: No discharge  Left eye: No discharge  Cardiovascular:      Rate and Rhythm: Normal rate and regular rhythm  Heart sounds: Normal heart sounds  No murmur  Pulmonary:      Effort: Pulmonary effort is normal  No respiratory distress  Breath sounds: Normal breath sounds  No wheezing  Abdominal:      General: Bowel sounds are normal  There is no distension  Palpations: Abdomen is soft  Neurological:      General: No focal deficit present  Mental Status: She is alert and oriented to person, place, and time  Psychiatric:         Behavior: Behavior normal        Discussion with Family:  Discussed with patient  Discharge instructions/Information to patient and family:   See after visit summary for information provided to patient and family  Provisions for Follow-Up Care:  See after visit summary for information related to follow-up care and any pertinent home health orders  Disposition:     Home    For Discharges to [facility] SNF:   · Not Applicable to this Patient - Not Applicable to this Patient    Planned Readmission:  None     Discharge Statement:  I spent 20 minutes discharging the patient  This time was spent on the day of discharge  I had direct contact with the patient on the day of discharge  Greater than 50% of the total time was spent examining patient, answering all patient questions, arranging and discussing plan of care with patient as well as directly providing post-discharge instructions  Additional time then spent on discharge activities  Discharge Medications:  See after visit summary for reconciled discharge medications provided to patient and family        ** Please Note: This note has been constructed using a voice recognition system **

## 2021-03-11 NOTE — DISCHARGE INSTRUCTIONS
Hepatitis C   WHAT YOU NEED TO KNOW:   Hepatitis C is inflammation of the liver caused by hepatitis C virus (HCV) infection  DISCHARGE INSTRUCTIONS:   Return to the emergency department if:   · You have severe abdominal pain  · You are too dizzy to stand up  · You vomit blood or material that looks like coffee grounds  · You feel confused or are very sleepy  · Your bowel movements are red or black, and sticky  Call your doctor or hepatologist if:   · You have a fever  · You are vomiting and cannot keep food or liquids down  · Your abdomen or legs have a rash or are swollen  · You are bruising easily  · You have questions or concerns about your condition or care  Medicines:   · Antiviral medicines  are used to cure a hepatitis C infection  You will need to take these medicines for 8 to 12 weeks  Other kinds of medicines may be used to keep the virus from spreading or to prevent or decrease liver damage  The type of medicine you need will depend on how severe your hepatitis is  It will also depend on if you have liver damage  · Do not take any medicines without first talking to your healthcare provider  This includes medicine that has been ordered for you and over-the-counter medicine  Ask before you use vitamins, herbs, herbal teas, laxatives, or food supplements  Any of these could harm your liver  · Take your medicine as directed  Contact your healthcare provider if you think your medicine is not helping or if you have side effects  Tell him of her if you are allergic to any medicine  Keep a list of the medicines, vitamins, and herbs you take  Include the amounts, and when and why you take them  Bring the list or the pill bottles to follow-up visits  Carry your medicine list with you in case of an emergency  Manage hepatitis C:   · Do not drink alcohol or use illegal drugs  Alcohol and drugs can increase liver damage   Ask your healthcare provider for more information if you need help quitting  · Do not smoke  Nicotine can damage blood vessels and make it more difficult to manage hepatitis C  Do not use e-cigarettes or smokeless tobacco in place of cigarettes or to help you quit  They still contain nicotine  Ask your healthcare provider for information if you currently smoke and need help quitting  · Eat a variety of healthy foods  Healthy foods include fruits, vegetables, low-fat dairy products, beans, and lean meats and fish  Ask if you need to be on a special diet  · Get more rest   Slowly return to your normal activities when you feel better  · Talk to your healthcare provider about vaccines  You may need to get vaccines to protect you from hepatitis A or B  You may also need a pneumonia vaccine  Get the flu vaccine each year as soon as it is available  Ask your healthcare provider about other vaccines you need  Prevent the spread of HCV:  No vaccine is available to prevent hepatitis C  The following can help prevent HCV from spreading to others:  · Cover any open cuts or scratches  If blood from your wound gets on a surface, clean the surface with bleach right away  Throw away any items with blood or body fluids on them, as directed by your healthcare provider  · Do not share personal items  These items include toothbrushes, nail clippers, and razors  Do not share needles  · Tell household members and sex partners that you have HCV  They should be tested for HCV  Do not have sex, including oral and anal sex, until your healthcare provider tells you it is okay  If you have sex, make sure the male partner wears a latex condom  · Protect your baby  It is okay to breastfeed your baby unless your nipples are cracked or bleeding  If you are pregnant, ask your healthcare provider for more information on keeping your baby from getting HCV  Medicines used to treat hepatitis C cannot be used during pregnancy      · Do not donate blood, body organs, semen, or other tissues  Donations are checked for HCV, but it is best not to donate  Prevent the spread of germs:       · Wash your hands often  Wash your hands several times each day  Wash after you use the bathroom, change a child's diaper, and before you prepare or eat food  Use soap and water every time  Rub your soapy hands together, lacing your fingers  Wash the front and back of your hands, and in between your fingers  Use the fingers of one hand to scrub under the fingernails of the other hand  Wash for at least 20 seconds  Rinse with warm, running water for several seconds  Then dry your hands with a clean towel or paper towel  Use hand  that contains alcohol if soap and water are not available  Do not touch your eyes, nose, or mouth without washing your hands first          · Cover a sneeze or cough  Use a tissue that covers your mouth and nose  Throw the tissue away in a trash can right away  Use the bend of your arm if a tissue is not available  Wash your hands well with soap and water or use a hand   · Stay away from others while you are sick  Avoid crowds as much as possible  Follow up with your doctor or hepatologist as directed: If you took medicine to treat hepatitis C, your blood will be checked for HCV 12 weeks later  You may need ongoing tests or treatment  Write down your questions so you remember to ask them during your visits  © Copyright [address] Information is for End User's use only and may not be sold, redistributed or otherwise used for commercial purposes  All illustrations and images included in CareNotes® are the copyrighted property of A D A M , Inc  or [facility] Aime Espinoza   The above information is an  only  It is not intended as medical advice for individual conditions or treatments  Talk to your doctor, nurse or pharmacist before following any medical regimen to see if it is safe and effective for you    Hepatitis A   WHAT YOU NEED TO KNOW:   Hepatitis A is inflammation of the liver caused by hepatitis A virus (HAV) infection  HAV is most often spread through contaminated food or water, or close contact with someone who is infected  DISCHARGE INSTRUCTIONS:   Return to the emergency department if:   · You have severe abdominal pain  · You are too dizzy to stand up  · You vomit blood or material that looks like coffee grounds  · Your bowel movements are red or black, and sticky  · You feel confused, unusually sleepy, irritable, or jittery  Contact your healthcare provider if:   · You cannot drink liquids or keep food down  · You are bruising easily  · You have questions or concerns about your condition or care  Medicines:   · Check with your healthcare provider before you take any medicine  This includes over-the-counter medicine, herbs, and vitamins  Your healthcare provider may want you to change some of your medicines, or stop them, until your liver has recovered  · Take your medicine as directed  Contact your healthcare provider if you think your medicine is not helping or if you have side effects  Tell him of her if you are allergic to any medicine  Keep a list of the medicines, vitamins, and herbs you take  Include the amounts, and when and why you take them  Bring the list or the pill bottles to follow-up visits  Carry your medicine list with you in case of an emergency  Follow up with your healthcare provider as directed:  Write down your questions so you remember to ask them during your visits  Manage hepatitis A:   · Eat a variety of healthy foods  Healthy foods include fruits, vegetables, whole-grain breads, low-fat dairy products, and lean meats and fish  Your healthcare provider or dietitian may recommend that you limit protein foods such as milk, fish, meat, and fatty foods  Protein and fat make your liver work harder  As you feel better, you can add other kinds of foods       · Do not drink alcohol  Alcohol can increase liver damage  Talk to your healthcare provider if you drink alcohol and need help to stop  · Drink more liquids  Liquids help your liver function properly  Ask your healthcare provider how much liquid to drink each day and which liquids are best for you  · Get more rest   Rest if you are tired  Slowly return to your normal activities when you feel better  Prevent the spread of HAV:  You are most contagious in the 2 weeks before and the first week after you become jaundiced  Your friends, sex partners, and family members may need to get the hepatitis A vaccine  If you already have hepatitis A, it is too late to get the vaccine  The following are important things you can do to keep from spreading the infection:  · Do not share dishes or utensils  Soak dishes and utensils in boiling water  Then wash them, or use a   You may want to use disposable dishes  · Do not prepare food or meals for other people  · Wash your hands well before you eat and after you use the bathroom or change a child's diaper  · Wash clothing and bedding in the hottest water setting  · Clean toilets with a product that kills germs  · Let your healthcare provider know if your work involves preparing or serving food, or close physical contact with other people  If you do this kind of work, the health department will need to evaluate if these people have been exposed to hepatitis A  You cannot return to work until your healthcare provider says it is safe  © Copyright [employer] 2018 Information is for End User's use only and may not be sold, redistributed or otherwise used for commercial purposes  All illustrations and images included in CareNotes® are the copyrighted property of [employer]  or Wilber Espinoza   The above information is an  only  It is not intended as medical advice for individual conditions or treatments   Talk to your doctor, nurse or pharmacist before following any medical regimen to see if it is safe and effective for you  Hepatitis A   WHAT YOU NEED TO KNOW:   Hepatitis A is inflammation of the liver caused by hepatitis A virus (HAV) infection  HAV is most often spread through contaminated food or water, or close contact with someone who is infected  HAV infection can be prevented with 2 or 3 doses of the hepatitis A vaccine  The vaccine must be given before you are infected with HAV  You can get the vaccine as an adult if you did not get it as a child  Your healthcare provider can give you more information  He or she can tell you when to get the vaccine, and how many doses to get  DISCHARGE INSTRUCTIONS:   Seek care immediately if:   · You have severe abdominal pain  · You are too dizzy to stand up  · You vomit blood or material that looks like coffee grounds  · Your bowel movements are red or black, and sticky  · You feel confused, unusually sleepy, irritable, or jittery  Call your doctor if:   · You cannot drink liquids or keep food down  · You are bruising easily  · You have questions or concerns about your condition or care  Medicines:   · Check with your healthcare provider before you take any medicine  This includes over-the-counter medicine, herbs, and vitamins  Your healthcare provider may want you to change some of your medicines, or stop them, until your liver has recovered  · Take your medicine as directed  Contact your healthcare provider if you think your medicine is not helping or if you have side effects  Tell him or her if you are allergic to any medicine  Keep a list of the medicines, vitamins, and herbs you take  Include the amounts, and when and why you take them  Bring the list or the pill bottles to follow-up visits  Carry your medicine list with you in case of an emergency  Manage hepatitis A:   · Eat a variety of healthy foods    Healthy foods include fruits, vegetables, whole-grain breads, low-fat dairy products, and lean meats and fish  Your healthcare provider or dietitian may recommend that you limit protein foods such as milk, fish, meat, and fatty foods  Protein and fat make your liver work harder  As you feel better, you can add other kinds of foods  · Do not drink alcohol  Alcohol can increase liver damage  Talk to your healthcare provider if you drink alcohol and need help to stop  · Drink more liquids  Liquids help your liver function properly  Ask your healthcare provider how much liquid to drink each day and which liquids are best for you  · Get more rest   Rest if you are tired  Slowly return to your normal activities when you feel better  How HAV is spread:   · Food handlers with HAV did not wash their hands after they used the bathroom  · Drinking water that was not clean or eating raw shellfish that came from water that was not clean  · Travel to areas in the world where hepatitis A is common or has an active outbreak  ·  workers did not wash their hands after they changed a diaper  · Sexual contact with someone who has hepatitis A, especially men who have sex with other men  Prevent the spread of HAV:  You are most contagious in the 2 weeks before and the first week after you become jaundiced  Your friends, sex partners, and family members may need to get the hepatitis A vaccine  If you already have hepatitis A, it is too late to get the vaccine  The following are important things you can do to keep from spreading the infection:  · Do not share dishes or utensils  Soak dishes and utensils in boiling water  Then wash them, or use a   You may want to use disposable dishes  · Do not prepare food or meals for other people  · Wash clothing and bedding in the hottest water setting  · Clean toilets with a product that kills germs      · Let your healthcare provider know if your work involves preparing or serving food, or close physical contact with other people  If you do this kind of work, the health department will need to evaluate if these people have been exposed to hepatitis A  You cannot return to work until your healthcare provider says it is safe  Prevent the spread of germs:       · Wash your hands often  Wash your hands several times each day  Wash after you use the bathroom, change a child's diaper, and before you prepare or eat food  Use soap and water every time  Rub your soapy hands together, lacing your fingers  Wash the front and back of your hands, and in between your fingers  Use the fingers of one hand to scrub under the fingernails of the other hand  Wash for at least 20 seconds  Rinse with warm, running water for several seconds  Then dry your hands with a clean towel or paper towel  Use hand  that contains alcohol if soap and water are not available  Do not touch your eyes, nose, or mouth without washing your hands first          · Cover a sneeze or cough  Use a tissue that covers your mouth and nose  Throw the tissue away in a trash can right away  Use the bend of your arm if a tissue is not available  Wash your hands well with soap and water or use a hand   · Stay away from others while you are sick  Avoid crowds as much as possible  · Ask about vaccines you may need  Talk to your healthcare provider about your vaccine history  He or she will tell you which vaccines you need, and when to get them  ? Get the influenza (flu) vaccine as soon as recommended each year  The flu vaccine is available starting in September or October  Flu viruses change, so it is important to get a flu vaccine every year  ? Get the pneumonia vaccine if recommended  This vaccine is usually recommended every 5 years  Your provider will tell you when to get this vaccine, if needed    Follow up with your doctor as directed:  Write down your questions so you remember to ask them during your visits  © Copyright [address] [place] [address] Information is for End User's use only and may not be sold, redistributed or otherwise used for commercial purposes  All illustrations and images included in CareNotes® are the copyrighted property of A D A M , Inc  or [employer]  The above information is an  only  It is not intended as medical advice for individual conditions or treatments  Talk to your doctor, nurse or pharmacist before following any medical regimen to see if it is safe and effective for you

## 2021-03-11 NOTE — CONSULTS
Patient MRN: [id number]  Date of Service: 3/11/2021  Referring Physician:  Inpatient Medicine  Provider Creating Note: Giselle Ramírez MD  PCP: No primary care provider on file  Reason for Consult:  Abnormal liver enzymes  HPI  Danilo Montaño is a 38 y o  female who was admitted with Jaundice  She is admitted to the hospital with jaundice and dark-colored urine  She states that this is not happened before  She has had a decreased appetite but no nausea or vomiting  She denies any diarrhea  She denies any fevers  No other particular sick contacts that she can remember  She does use IV drugs on a fairly regular basis  She has shared needles with her fiance who is known to have hepatitis C based on our discussion  She uses IV drugs because it becomes a coping mechanism when times are difficult  She had used IV drugs in the past but was clean until about a year ago  She also was a common drinker of alcohol beginning at age 10 and quit in her mid 25s  At 1 point she was on methadone in this seem to help her stop her addiction  She uses IV fentanyl  Past Medical History:   Diagnosis Date    Anxiety     Asthma     Intravenous drug abuse ([facility] )     Von Willebrand disease ([facility] )      Past Surgical History:   Procedure Laterality Date    APPENDECTOMY      CHOLECYSTECTOMY      PARTIAL HYSTERECTOMY       Medications  Home Medications:   Prior to Admission medications    Medication Sig Start Date End Date Taking?  Authorizing Provider   clonazePAM (KlonoPIN) 1 mg tablet Take 1 mg by mouth daily    Yes Historical Provider, MD   zolpidem (AMBIEN CR) 6 25 MG CR tablet Take 6 25 mg by mouth daily at bedtime as needed for sleep   Yes Historical Provider, MD       Inhouse Medications    Current Facility-Administered Medications:     acetaminophen (TYLENOL) tablet 650 mg, 650 mg, Oral, Q6H PRN, 650 mg at 03/11/21 0108    albuterol (PROVENTIL HFA,VENTOLIN HFA) inhaler 2 puff, 2 puff, Inhalation, Q4H PRN   aluminum-magnesium hydroxide-simethicone (MYLANTA) oral suspension 30 mL, 30 mL, Oral, Q6H PRN    clonazePAM (KlonoPIN) tablet 1 mg, 1 mg, Oral, Daily, 1 mg at 03/11/21 0800    cloNIDine (CATAPRES) tablet 0 2 mg, 0 2 mg, Oral, TID PRN    methadone (DOLOPHINE) tablet 40 mg, 40 mg, Oral, Daily, 40 mg at 03/11/21 0800    ondansetron (ZOFRAN) injection 4 mg, 4 mg, Intravenous, Q6H PRN    zolpidem (AMBIEN) tablet 5 mg, 5 mg, Oral, HS PRN, 5 mg at 03/10/21 2134    Allergies  No Known Allergies  Social History   reports that she has never smoked  She has never used smokeless tobacco  She reports current drug use  Drugs: Fentanyl and Heroin  She reports that she does not drink alcohol  Family History  History reviewed  No pertinent family history  ROS  ROS: Denies CP, SOB, fever, weight loss, adenopathy, rash  All others negative except as noted in HPI  Objective   Vitals  Blood pressure 96/72, pulse 89, temperature 97 6 °F (36 4 °C), temperature source Axillary, resp  rate 16, height 5' 2" (1 575 m), weight 77 2 kg (170 lb 3 1 oz), SpO2 98 %, not currently breastfeeding  Physical Exam  Patient does have mild icterus  No significant jaundice however skin is dark   Lungs are clear  Heart rate rhythm is regular  Abdomen is overweight  No rebound or guarding  There is some slight discomfort in the right upper quadrant  Bowel sounds regular  No significant peripheral edema  Mood is pleasant  Patient is cooperative  Does not seem depressed    Neuro exam is nonfocal   No skin rashes seen  Laboratory Studies  Lab Results   Component Value Date    WBC 7 60 03/10/2021    HGB 13 2 03/10/2021    HCT 40 2 03/10/2021     03/10/2021    MCV 85 03/10/2021     Lab Results   Component Value Date    CREATININE 0 89 03/11/2021    BUN 10 03/11/2021    SODIUM 139 03/11/2021    K 3 9 03/11/2021    CL 97 03/11/2021    CO2 37 (H) 03/11/2021    CALCIUM 8 7 03/11/2021    ALKPHOS 221 (H) 03/11/2021     (H) 03/11/2021     (H) 03/11/2021     Lab Results   Component Value Date    PROTIME 14 2 03/11/2021    INR 1 09 03/11/2021   Hepatitis C antibody low reactive  Hepatitis a IgM antibody positive    Imaging and Other Studies  Reviewed    Assessment and Plan:  75-year-old female presents with new onset jaundice and current lab tests most consistent with hepatitis a infection  This is generally a food related illness and is transmitted fecal oral   Also concerning is slight positivity to the hepatitis C antibody which certainly would be expected in someone sharing needles with a known hepatitis C positive individual   Bilirubin seems to have plateaued and is improving today as are transaminases  Suggestions  Check HIV status  Avoid illicit drug use  Repeat liver enzymes every week  If liver enzymes remain elevated after 4-6 weeks, would check hepatitis C PCR  If patient is chronically infected with hepatitis-C, she may be a candidate for antiviral therapy so long as she is not still using IV drugs  At some point would check hepatitis B surface antibody to see if she has been immunized and offer immunization if antibody is negative  Hepatitis a is generally treated supportive management and without vomiting or diarrhea should resolve without any long-term consequences  Should patient's hepatitis C become a chronic problem and she is clean from drugs would be happy to discuss possible treatment options at that time  She will be following up in the local clinic and she may need methadone to help keep her clean from IV drugs    If patient is able to eat and tolerate diet, can be discharged from GI perspective  Principal Problem:    Jaundice  Active Problems:    Anxiety    Von Willebrand disease ([facility] )    Asthma    Heroin abuse ([facility] )      Melissa Dominique MD

## 2021-03-11 NOTE — UTILIZATION REVIEW
Initial Clinical Review    Admission: Date/Time/Statement:   Admission Orders (From admission, onward)     Ordered        03/10/21 1406  Place in Observation  Once                   Orders Placed This Encounter   Procedures    Place in Observation     Standing Status:   Standing     Number of Occurrences:   1     Order Specific Question:   Level of Care     Answer:   Med Surg [16]     ED Arrival Information     Expected Arrival Acuity Means of Arrival Escorted By Service Admission Type    - 3/10/2021 10:06 Urgent Walk-In Self General Medicine Urgent    Arrival Complaint    abd pain        Chief Complaint   Patient presents with    Abdominal Pain     states urine dark for over 1 week now with pain on both sides of abdomen; feeling low energy and weak; states no appetiate  Assessment/Plan:  37 yo female presents to ED with epigastric pain, feeling weak, decreased appetite, nausea, dark urine, yellow skin & eyes  PMH of  IV heroin abuse, von Willebrand's disease, asthma and anxiety  She injected about 11 packs prior to admission mixed with fentanyl  She has been free from drugs for 13 years until 2 years ago  Lives with her finance who has hepatitis C  Findings: Jaundice, scleral icterus, + tenderness RUQ  Elevated , , Alk phos 251 & T Bili 5 10, Lipase 410, D-Dimer 2 36  Na 136, Mag 2 4  CT study was negative for PE, concern for colitis, L5-S1 disc herniation and 1 cm low-density lesion in the right uterus    Admitted to Observation with Jaundice-Suspect viral hepatitis given IV drug abuse, Heroin Abuse  Consult GI, F/U viral hepatitis panel, hepatic function with BMP in am, start methadone for opioid withdrawal and clonidine           ED Triage Vitals [03/10/21 1013]   Temperature Pulse Respirations Blood Pressure SpO2   (!) 96 7 °F (35 9 °C) 90 16 108/69 99 %      Temp Source Heart Rate Source Patient Position - Orthostatic VS BP Location FiO2 (%)   Tympanic Monitor Sitting Left arm -- Pain Score       8          Wt Readings from Last 1 Encounters:   03/10/21 77 2 kg (170 lb 3 1 oz)     Additional Vital Signs:    03/11/21 0728  97 6 °F (36 4 °C)  89  16  96/72  98 %  None (Room air)  Lying   03/11/21 0107  97 3 °F (36 3 °C)Abnormal   --  --  98/68  --  --  Lying   03/11/21 0033  98 8 °F (37 1 °C)  92  16  89/53Abnormal   96 %  None (Room air)  Lying   03/10/21 1511  96 8 °F (36 °C)Abnormal   83  16  101/77  98 %  None (Room air)  Sitting   03/10/21 1239  97 1 °F (36 2 °C)Abnormal   86  18  106/70  99 %  None (Room air)  Lying       Pertinent Labs/Diagnostic Test Results:   Results from last 7 days   Lab Units 03/10/21  1032   WBC Thousand/uL 7 60   HEMOGLOBIN g/dL 13 2   HEMATOCRIT % 40 2   PLATELETS Thousands/uL 371   NEUTROS ABS Thousands/µL 4 50     Results from last 7 days   Lab Units 03/11/21  0441 03/10/21  1032   SODIUM mmol/L 139 136*   POTASSIUM mmol/L 3 9 3 8   CHLORIDE mmol/L 97 95*   CO2 mmol/L 37* 34*   ANION GAP mmol/L 5 7   BUN mg/dL 10 10   CREATININE mg/dL 0 89 0 88   EGFR ml/min/1 73sq m 83 84   CALCIUM mg/dL 8 7 8 8   MAGNESIUM mg/dL  --  2 4*   PHOSPHORUS mg/dL  --  3 4     Results from last 7 days   Lab Units 03/11/21  0441 03/10/21  1032   AST U/L 617* 798*   ALT U/L 754* 985*   ALK PHOS U/L 221* 251*   TOTAL PROTEIN g/dL 8 1 8 8*   ALBUMIN g/dL 3 6 4 0   TOTAL BILIRUBIN mg/dL 3 20* 5 10*   BILIRUBIN DIRECT mg/dL 2 40*  --      Results from last 7 days   Lab Units 03/11/21  0441 03/10/21  1032   GLUCOSE RANDOM mg/dL 86 112*     Results from last 7 days   Lab Units 03/10/21  1032   CK TOTAL U/L <20*     Results from last 7 days   Lab Units 03/10/21  1032   D-DIMER QUANTITATIVE ug/ml FEU 2 36*     Results from last 7 days   Lab Units 03/11/21  0441 03/10/21  1032   PROTIME seconds 14 2 14 5   INR  1 09 1 12   PTT seconds  --  35     Results from last 7 days   Lab Units 03/10/21  1032   LIPASE u/L 410*     Results from last 7 days   Lab Units 03/10/21  1320   CLARITY UA  Clear COLOR UA  Ruthann*   SPEC GRAV UA  1 010   PH UA  7 0   GLUCOSE UA mg/dl Negative   KETONES UA mg/dl Negative   BLOOD UA  Negative   PROTEIN UA mg/dl Negative   NITRITE UA  Negative   BILIRUBIN UA  Negative   UROBILINOGEN UA mg/dL 1 0   LEUKOCYTES UA  Negative     Results from last 7 days   Lab Units 03/10/21  1320   AMPH/METH  Negative   BARBITURATE UR  Negative   BENZODIAZEPINE UR  Negative   COCAINE UR  Negative   METHADONE URINE  Negative   OPIATE UR  Negative   PCP UR  Negative   THC UR  Negative     Results from last 7 days   Lab Units 03/10/21  1032   ACETAMINOPHEN LVL ug/mL <10*     3/10 CT PE study w/ A&P: Unremarkable chest   Mild inflammation along both sides of the colon with small amount of free fluid in the left side of abdomen near the colon and also in the pelvis   Findings are nonspecific but may suggest a colitis  1 cm indeterminate low-density lesion in the right side of the uterus which might be further assessed with nonemergent follow-up pelvic ultrasound  L5-S1 disc herniation is suggested in the posterior midline extending behind L5 vertebral body with some central canal narrowing noted   Consider follow-up MRI of the lumbar spine on a nonemergent basis for better characterization       ED Treatment:   Medication Administration from 03/10/2021 1006 to 03/10/2021 1510       Date/Time Order Dose Route Action     03/10/2021 1033 lactated ringers bolus 1,000 mL 1,000 mL Intravenous New Bag     03/10/2021 1126 ketorolac (TORADOL) injection 15 mg 15 mg Intravenous Given        Past Medical History:   Diagnosis Date    Anxiety     Asthma     [facility]ven[facility] drug [facility]     [facility]brand disease ([facility] )      Present on Admission:   Anxiety   Von Willebrand disease ([facility] )   Asthma   Heroin abuse ([facility] )   Jaundice      Admitting Diagnosis: Abdominal pain [R10 9]  Hepatitis [K75 9]  Jaundice [R17]  Transaminitis [R74 01]  Drug use [F19 90]  Age/Sex: 38 y o  female     Admission Orders:  Scheduled Medications:  clonazePAM, 1 mg, Oral, Daily  methadone, 40 mg, Oral, Daily    Continuous IV Infusions:  PRN Meds:  acetaminophen, 650 mg, Oral, Q6H PRN x 1 3/11  albuterol, 2 puff, Inhalation, Q4H PRN  aluminum-magnesium hydroxide-simethicone, 30 mL, Oral, Q6H PRN  cloNIDine, 0 2 mg, Oral, TID PRN  ondansetron, 4 mg, Intravenous, Q6H PRN  zolpidem, 5 mg, Oral, HS PRN x 1 3/10    SCDS  IP CONSULT TO GASTROENTEROLOGY  IP CONSULT TO CASE MANAGEMENT    Network Utilization Review Department  ATTENTION: Please call with any questions or concerns to [phone] and carefully listen to the prompts so that you are directed to the right person  All voicemails are confidential   [place] all requests for admission clinical reviews, approved or denied determinations and any other requests to dedicated fax number below belonging to the campus where the patient is receiving treatment   List of dedicated fax numbers for the Facilities:  [address] DENIALS (Administrative/Medical Necessity) [phone]   [address] (Maternity/NICU/Pediatrics) [phone]   [address] Cassy Dhaliwal [address]272 ([address]) [address] [place] [address] Giovanna Valdez 14[address] [place] [address] [phone]

## 2021-03-11 NOTE — CASE MANAGEMENT
CM called [facility] to schedule a follow up PCP appointment since pt is currently not active with a PCP  CM was sent to   CM left a message asking them to follow up  CM also sent a staff message to [facility] asking the same  CM met with pt at bedside and provided her with [facility] contact information  Rn arrived at that time to go over discharge instructions  No other needs at this time  Pt has the number to her methadone clinic to arrange follow up  Please contact CM with any questions or concerns in regards to dc planning

## 2021-03-11 NOTE — PLAN OF CARE
Problem: Potential for Falls  Goal: Patient will remain free of falls  Description: INTERVENTIONS:  - Assess patient frequently for physical needs  -  Identify cognitive and physical deficits and behaviors that affect risk of falls    -  [place] fall precautions as indicated by assessment   - Educate patient/family on patient safety including physical limitations  - Instruct patient to call for assistance with activity based on assessment  - Modify environment to reduce risk of injury  - Consider OT/PT consult to assist with strengthening/mobility  Outcome: Progressing     Problem: GENITOURINARY - ADULT  Goal: Maintains or returns to baseline urinary function  Description: INTERVENTIONS:  - Assess urinary function  - Encourage oral fluids to ensure adequate hydration if ordered  - Administer IV fluids as ordered to ensure adequate hydration  - Administer ordered medications as needed  - Offer frequent toileting  - Follow urinary retention protocol if ordered  Outcome: Progressing  Goal: Absence of urinary retention  Description: INTERVENTIONS:  - Assess patients ability to void and empty bladder  - Monitor I/O  - Bladder scan as needed  - Discuss with physician/AP medications to alleviate retention as needed  - Discuss catheterization for long term situations as appropriate  Outcome: Progressing  Goal: Urinary catheter remains patent  Description: INTERVENTIONS:  - Assess patency of urinary catheter  - If patient has a chronic crowder, consider changing catheter if non-functioning  - Follow guidelines for intermittent irrigation of non-functioning urinary catheter  Outcome: Progressing     Problem: METABOLIC, FLUID AND ELECTROLYTES - ADULT  Goal: Electrolytes maintained within normal limits  Description: INTERVENTIONS:  - Monitor labs and assess patient for signs and symptoms of electrolyte imbalances  - Administer electrolyte replacement as ordered  - Monitor response to electrolyte replacements, including repeat lab results as appropriate  - Instruct patient on fluid and nutrition as appropriate  Outcome: Progressing  Goal: Fluid balance maintained  Description: INTERVENTIONS:  - Monitor labs   - Monitor I/O and WT  - Instruct patient on fluid and nutrition as appropriate  - Assess for signs & symptoms of volume excess or deficit  Outcome: Progressing  Goal: Glucose maintained within target range  Description: INTERVENTIONS:  - Monitor Blood Glucose as ordered  - Assess for signs and symptoms of hyperglycemia and hypoglycemia  - Administer ordered medications to maintain glucose within target range  - Assess nutritional intake and initiate nutrition service referral as needed  Outcome: Progressing     Problem: PAIN - ADULT  Goal: Verbalizes/displays adequate comfort level or baseline comfort level  Description: Interventions:  - Encourage patient to monitor pain and request assistance  - Assess pain using appropriate pain scale  - Administer analgesics based on type and severity of pain and evaluate response  - Implement non-pharmacological measures as appropriate and evaluate response  - Consider cultural and social influences on pain and pain management  - Notify physician/advanced practitioner if interventions unsuccessful or patient reports new pain  Outcome: Progressing     Problem: INFECTION - ADULT  Goal: Absence or prevention of progression during hospitalization  Description: INTERVENTIONS:  - Assess and monitor for signs and symptoms of infection  - Monitor lab/diagnostic results  - Monitor all insertion sites, i e  indwelling lines, tubes, and drains  - Monitor endotracheal if appropriate and nasal secretions for changes in amount and color  - [place] appropriate cooling/warming therapies per order  - Administer medications as ordered  - Instruct and encourage patient and family to use good hand hygiene technique  - Identify and instruct in appropriate isolation precautions for identified infection/condition  Outcome: Progressing  Goal: Absence of fever/infection during neutropenic period  Description: INTERVENTIONS:  - Monitor WBC    Outcome: Progressing     Problem: SAFETY ADULT  Goal: Patient will remain free of falls  Description: INTERVENTIONS:  - Assess patient frequently for physical needs  -  Identify cognitive and physical deficits and behaviors that affect risk of falls    -  [place] fall precautions as indicated by assessment   - Educate patient/family on patient safety including physical limitations  - Instruct patient to call for assistance with activity based on assessment  - Modify environment to reduce risk of injury  - Consider OT/PT consult to assist with strengthening/mobility  Outcome: Progressing  Goal: Maintain or return to baseline ADL function  Description: INTERVENTIONS:  -  Assess patient's ability to carry out ADLs; assess patient's baseline for ADL function and identify physical deficits which impact ability to perform ADLs (bathing, care of mouth/teeth, toileting, grooming, dressing, etc )  - Assess/evaluate cause of self-care deficits   - Assess range of motion  - Assess patient's mobility; develop plan if impaired  - Assess patient's need for assistive devices and provide as appropriate  - Encourage maximum independence but intervene and supervise when necessary  - Involve family in performance of ADLs  - Assess for home care needs following discharge   - Consider OT consult to assist with ADL evaluation and planning for discharge  - Provide patient education as appropriate  Outcome: Progressing  Goal: Maintain or return mobility status to optimal level  Description: INTERVENTIONS:  - Assess patient's baseline mobility status (ambulation, transfers, stairs, etc )    - Identify cognitive and physical deficits and behaviors that affect mobility  - Identify mobility aids required to assist with transfers and/or ambulation (gait belt, sit-to-stand, lift, walker, cane, etc )  - [place] fall precautions as indicated by assessment  - Record patient progress and toleration of activity level on Mobility SBAR; progress patient to next Phase/Stage  - Instruct patient to call for assistance with activity based on assessment  - Consider rehabilitation consult to assist with strengthening/weightbearing, etc   Outcome: Progressing     Problem: DISCHARGE PLANNING  Goal: Discharge to home or other facility with appropriate resources  Description: INTERVENTIONS:  - Identify barriers to discharge w/patient and caregiver  - Arrange for needed discharge resources and transportation as appropriate  - Identify discharge learning needs (meds, wound care, etc )  - Arrange for interpretive services to assist at discharge as needed  - Refer to Case Management Department for coordinating discharge planning if the patient needs post-hospital services based on physician/advanced practitioner order or complex needs related to functional status, cognitive ability, or social support system  Outcome: Progressing     Problem: Knowledge Deficit  Goal: Patient/family/caregiver demonstrates understanding of disease process, treatment plan, medications, and discharge instructions  Description: Complete learning assessment and assess knowledge base  Interventions:  - Provide teaching at level of understanding  - Provide teaching via preferred learning methods  Outcome: Progressing     Problem: Nutrition/Hydration-ADULT  Goal: Nutrient/Hydration intake appropriate for improving, restoring or maintaining nutritional needs  Description: Monitor and assess patient's nutrition/hydration status for malnutrition  Collaborate with interdisciplinary team and initiate plan and interventions as ordered  Monitor patient's weight and dietary intake as ordered or per policy  Utilize nutrition screening tool and intervene as necessary   Determine patient's food preferences and provide high-protein, high-caloric foods as appropriate       INTERVENTIONS:  - Monitor oral intake, urinary output, labs, and treatment plans  - Assess nutrition and hydration status and recommend course of action  - Evaluate amount of meals eaten  - Assist patient with eating if necessary   - Allow adequate time for meals  - Recommend/ encourage appropriate diets, oral nutritional supplements, and vitamin/mineral supplements  - Order, calculate, and assess calorie counts as needed  - Recommend, monitor, and adjust tube feedings and TPN/PPN based on assessed needs  - Assess need for intravenous fluids  - Provide specific nutrition/hydration education as appropriate  - Include patient/family/caregiver in decisions related to nutrition  Outcome: Progressing

## 2021-03-11 NOTE — INCIDENTAL FINDINGS
The following findings require follow up:  Radiographic finding   Finding: L5-S1 disc herniation is suggested in the posterior midline extending behind L5 vertebral body   Follow up required:  MRI lumbosacral region   Follow up should be done within 3 month(s)    Please notify the following clinician to assist with the follow up:   Patient Needs tablets care with PCP

## 2021-03-11 NOTE — PLAN OF CARE
Problem: Potential for Falls  Goal: Patient will remain free of falls  Description: INTERVENTIONS:  - Assess patient frequently for physical needs  -  Identify cognitive and physical deficits and behaviors that affect risk of falls    -  [place] fall precautions as indicated by assessment   - Educate patient/family on patient safety including physical limitations  - Instruct patient to call for assistance with activity based on assessment  - Modify environment to reduce risk of injury  - Consider OT/PT consult to assist with strengthening/mobility  Outcome: Progressing     Problem: GENITOURINARY - ADULT  Goal: Maintains or returns to baseline urinary function  Description: INTERVENTIONS:  - Assess urinary function  - Encourage oral fluids to ensure adequate hydration if ordered  - Administer IV fluids as ordered to ensure adequate hydration  - Administer ordered medications as needed  - Offer frequent toileting  - Follow urinary retention protocol if ordered  Outcome: Progressing  Goal: Absence of urinary retention  Description: INTERVENTIONS:  - Assess patients ability to void and empty bladder  - Monitor I/O  - Bladder scan as needed  - Discuss with physician/AP medications to alleviate retention as needed  - Discuss catheterization for long term situations as appropriate  Outcome: Progressing  Goal: Urinary catheter remains patent  Description: INTERVENTIONS:  - Assess patency of urinary catheter  - If patient has a chronic crowder, consider changing catheter if non-functioning  - Follow guidelines for intermittent irrigation of non-functioning urinary catheter  Outcome: Progressing     Problem: METABOLIC, FLUID AND ELECTROLYTES - ADULT  Goal: Electrolytes maintained within normal limits  Description: INTERVENTIONS:  - Monitor labs and assess patient for signs and symptoms of electrolyte imbalances  - Administer electrolyte replacement as ordered  - Monitor response to electrolyte replacements, including repeat lab results as appropriate  - Instruct patient on fluid and nutrition as appropriate  Outcome: Progressing  Goal: Fluid balance maintained  Description: INTERVENTIONS:  - Monitor labs   - Monitor I/O and WT  - Instruct patient on fluid and nutrition as appropriate  - Assess for signs & symptoms of volume excess or deficit  Outcome: Progressing  Goal: Glucose maintained within target range  Description: INTERVENTIONS:  - Monitor Blood Glucose as ordered  - Assess for signs and symptoms of hyperglycemia and hypoglycemia  - Administer ordered medications to maintain glucose within target range  - Assess nutritional intake and initiate nutrition service referral as needed  Outcome: Progressing     Problem: PAIN - ADULT  Goal: Verbalizes/displays adequate comfort level or baseline comfort level  Description: Interventions:  - Encourage patient to monitor pain and request assistance  - Assess pain using appropriate pain scale  - Administer analgesics based on type and severity of pain and evaluate response  - Implement non-pharmacological measures as appropriate and evaluate response  - Consider cultural and social influences on pain and pain management  - Notify physician/advanced practitioner if interventions unsuccessful or patient reports new pain  Outcome: Progressing     Problem: INFECTION - ADULT  Goal: Absence or prevention of progression during hospitalization  Description: INTERVENTIONS:  - Assess and monitor for signs and symptoms of infection  - Monitor lab/diagnostic results  - Monitor all insertion sites, i e  indwelling lines, tubes, and drains  - Monitor endotracheal if appropriate and nasal secretions for changes in amount and color  - [place] appropriate cooling/warming therapies per order  - Administer medications as ordered  - Instruct and encourage patient and family to use good hand hygiene technique  - Identify and instruct in appropriate isolation precautions for identified infection/condition  Outcome: Progressing  Goal: Absence of fever/infection during neutropenic period  Description: INTERVENTIONS:  - Monitor WBC    Outcome: Progressing     Problem: SAFETY ADULT  Goal: Patient will remain free of falls  Description: INTERVENTIONS:  - Assess patient frequently for physical needs  -  Identify cognitive and physical deficits and behaviors that affect risk of falls    -  [place] fall precautions as indicated by assessment   - Educate patient/family on patient safety including physical limitations  - Instruct patient to call for assistance with activity based on assessment  - Modify environment to reduce risk of injury  - Consider OT/PT consult to assist with strengthening/mobility  Outcome: Progressing  Goal: Maintain or return to baseline ADL function  Description: INTERVENTIONS:  -  Assess patient's ability to carry out ADLs; assess patient's baseline for ADL function and identify physical deficits which impact ability to perform ADLs (bathing, care of mouth/teeth, toileting, grooming, dressing, etc )  - Assess/evaluate cause of self-care deficits   - Assess range of motion  - Assess patient's mobility; develop plan if impaired  - Assess patient's need for assistive devices and provide as appropriate  - Encourage maximum independence but intervene and supervise when necessary  - Involve family in performance of ADLs  - Assess for home care needs following discharge   - Consider OT consult to assist with ADL evaluation and planning for discharge  - Provide patient education as appropriate  Outcome: Progressing  Goal: Maintain or return mobility status to optimal level  Description: INTERVENTIONS:  - Assess patient's baseline mobility status (ambulation, transfers, stairs, etc )    - Identify cognitive and physical deficits and behaviors that affect mobility  - Identify mobility aids required to assist with transfers and/or ambulation (gait belt, sit-to-stand, lift, walker, cane, etc )  - [place] fall precautions as indicated by assessment  - Record patient progress and toleration of activity level on Mobility SBAR; progress patient to next Phase/Stage  - Instruct patient to call for assistance with activity based on assessment  - Consider rehabilitation consult to assist with strengthening/weightbearing, etc   Outcome: Progressing     Problem: DISCHARGE PLANNING  Goal: Discharge to home or other facility with appropriate resources  Description: INTERVENTIONS:  - Identify barriers to discharge w/patient and caregiver  - Arrange for needed discharge resources and transportation as appropriate  - Identify discharge learning needs (meds, wound care, etc )  - Arrange for interpretive services to assist at discharge as needed  - Refer to Case Management Department for coordinating discharge planning if the patient needs post-hospital services based on physician/advanced practitioner order or complex needs related to functional status, cognitive ability, or social support system  Outcome: Progressing     Problem: Knowledge Deficit  Goal: Patient/family/caregiver demonstrates understanding of disease process, treatment plan, medications, and discharge instructions  Description: Complete learning assessment and assess knowledge base  Interventions:  - Provide teaching at level of understanding  - Provide teaching via preferred learning methods  Outcome: Progressing     Problem: Nutrition/Hydration-ADULT  Goal: Nutrient/Hydration intake appropriate for improving, restoring or maintaining nutritional needs  Description: Monitor and assess patient's nutrition/hydration status for malnutrition  Collaborate with interdisciplinary team and initiate plan and interventions as ordered  Monitor patient's weight and dietary intake as ordered or per policy  Utilize nutrition screening tool and intervene as necessary   Determine patient's food preferences and provide high-protein, high-caloric foods as appropriate       INTERVENTIONS:  - Monitor oral intake, urinary output, labs, and treatment plans  - Assess nutrition and hydration status and recommend course of action  - Evaluate amount of meals eaten  - Assist patient with eating if necessary   - Allow adequate time for meals  - Recommend/ encourage appropriate diets, oral nutritional supplements, and vitamin/mineral supplements  - Order, calculate, and assess calorie counts as needed  - Recommend, monitor, and adjust tube feedings and TPN/PPN based on assessed needs  - Assess need for intravenous fluids  - Provide specific nutrition/hydration education as appropriate  - Include patient/family/caregiver in decisions related to nutrition  Outcome: Progressing

## 2021-03-11 NOTE — CASE MANAGEMENT
CM attempted to meet with pt to discuss discharge needs  Pt sound asleep  CM will follow up with pt later today

## 2021-03-11 NOTE — ASSESSMENT & PLAN NOTE
· Presented with jaundice and impaired appetite  · Acute hepatitis panel positive for IgM hepatitis-A as well as hepatitis-C low reactive  · Scleral icterus noted on exam  · Transaminitis trending down  · Total bilirubin trending down  · CT scan Mild intrahepatic bile duct prominence likely secondary to cholecystectomy no evidence of CBD stone  · Consult gastroenterology recommendation patient can be discharged home after tolerating diet, follow-up as outpatient, counseling provided for good hand washing and hygiene  · Hepatitis-C low reactive will check PCR RNA as outpatient  · Patient will be discharged home today recommendation to follow-up with PCP Clinic information provided

## 2021-03-11 NOTE — ASSESSMENT & PLAN NOTE
· Reported using 11 bags of heroin IV last use this morning  She reported using clean needles only her  needles  She mix it with fentanyl she was clean for the past 13 years until 1 year ago  She requested methadone add as it help in the past   ·  methadone 40 mg for opioid withdrawal  · Clonidine for withdrawal symptoms  · Offered inpatient detox declined  · Counseling for drug cessation  · Patient reported will follow up with methadone Clinic she has information for contact

## 2021-03-16 ENCOUNTER — TELEPHONE (OUTPATIENT)
Dept: GASTROENTEROLOGY | Facility: MEDICAL CENTER | Age: 36
End: 2021-03-16

## 2021-03-23 RX ORDER — ZOLPIDEM TARTRATE 5 MG/1
5 TABLET ORAL
COMMUNITY
Start: 2020-12-22 | End: 2021-03-25

## 2021-03-23 RX ORDER — ZOLPIDEM TARTRATE 10 MG/1
10 TABLET ORAL
COMMUNITY
Start: 2021-01-19

## 2021-03-23 RX ORDER — ESCITALOPRAM OXALATE 20 MG/1
TABLET ORAL
COMMUNITY
Start: 2021-01-27 | End: 2021-03-25

## 2021-03-23 RX ORDER — DICYCLOMINE HYDROCHLORIDE 10 MG/1
10 CAPSULE ORAL
COMMUNITY
End: 2021-03-25

## 2021-03-25 ENCOUNTER — OFFICE VISIT (OUTPATIENT)
Dept: FAMILY MEDICINE CLINIC | Facility: CLINIC | Age: 36
End: 2021-03-25

## 2021-03-25 ENCOUNTER — TELEPHONE (OUTPATIENT)
Dept: FAMILY MEDICINE CLINIC | Facility: CLINIC | Age: 36
End: 2021-03-25

## 2021-03-25 VITALS
DIASTOLIC BLOOD PRESSURE: 78 MMHG | BODY MASS INDEX: 29.87 KG/M2 | HEART RATE: 97 BPM | OXYGEN SATURATION: 99 % | TEMPERATURE: 96.3 F | HEIGHT: 62 IN | WEIGHT: 162.3 LBS | RESPIRATION RATE: 18 BRPM | SYSTOLIC BLOOD PRESSURE: 102 MMHG

## 2021-03-25 DIAGNOSIS — F11.90 HEROIN USE: ICD-10-CM

## 2021-03-25 DIAGNOSIS — R76.8 HEPATITIS C ANTIBODY POSITIVE IN BLOOD: ICD-10-CM

## 2021-03-25 DIAGNOSIS — B15.9 VIRAL HEPATITIS A WITHOUT HEPATIC COMA: Primary | ICD-10-CM

## 2021-03-25 DIAGNOSIS — D68.0 VON WILLEBRAND DISEASE (HCC): ICD-10-CM

## 2021-03-25 DIAGNOSIS — F41.9 ANXIETY: ICD-10-CM

## 2021-03-25 PROCEDURE — 3725F SCREEN DEPRESSION PERFORMED: CPT | Performed by: FAMILY MEDICINE

## 2021-03-25 PROCEDURE — 3008F BODY MASS INDEX DOCD: CPT | Performed by: FAMILY MEDICINE

## 2021-03-25 PROCEDURE — 1036F TOBACCO NON-USER: CPT | Performed by: FAMILY MEDICINE

## 2021-03-25 PROCEDURE — 99203 OFFICE O/P NEW LOW 30 MIN: CPT | Performed by: FAMILY MEDICINE

## 2021-03-25 NOTE — Clinical Note
Hi  This patient is looking to start methadone treatment  Could you please help her find a place? Thanks  Her telephone number is correct

## 2021-03-25 NOTE — PATIENT INSTRUCTIONS
Psychiatry  [address], [place], [place], [address] [facility]  [facility]   [address], [address] [place], [place], [address] [place] [address]

## 2021-03-25 NOTE — PROGRESS NOTES
Chief Complaint   Patient presents with    Transition of Care Management     d/c 03/11/21 Hep A     Blood Pressure: 102/78, Pulse: 97, Respirations: 18, Temperature: (!) 96 3 °F (35 7 °C), Temp Source: Temporal, SpO2: 99 %, Weight - Scale: 73 6 kg (162 lb 4 8 oz), Height: 5' 2" (157 5 cm), Pain Score: 0-No pain    Assessment/Plan  1  Acute Hep A  Asymptomatic  2  Hep C ab positive  3  Advised to go for CMP and Hep C qualitative PCR ordered at discharge  Keep GI appointment  4  Heroin use  Interested in quitting  Received methadone while hospitalized and would like to follow-up with methadone program out-patient  Message sent to  to help coordinate, assistance appreciated  If she is unable to go there due to not having reliable daily transportation, I recommended she considers suboxone here  I will see her in 1 month after her GI appointment  5  History of anxiety on Klonopin  Patient agrees with psychiatry referral  Contact information in AVS   6  History of Von Willebrand disease  Work-up was about a decade ago in [place] and it sounds like she used to take intranasal desmopressin  She is interested in reestablishing care with hematology  Referral placed and contact information provided in AVS     The above was discussed in layman's terms, the patient was engaged using the shared-decision making process, verbalized understanding of the treatment plan, and all questions were answered to the patient's satisfaction  Diagnoses and all orders for this visit:    Viral hepatitis A without hepatic coma    Hepatitis C antibody positive in blood    Heroin use  -     Ambulatory referral to social work care management program; Future    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[address] )  -     Ambulatory referral to Hematology / Oncology; Future    Anxiety  -     Ambulatory referral to Psychiatry; Future        Subjective/HPI  1  Patient is not well known to me  New patient for hospital follow-up    Medical history of anxiety and depression, taking Klonopin and Ambien, last RX'd 9/17/2020  Asthma using albuterol PRN 3x/week, no night time awakenings, never hospitalized for asthma  Current IV heroin use  2  TCM visit for hospitalization 3/10 to 3/11  Admitted for acute hepatitis A  Elevated AST/ALT/bili  Also found to be Hep C ab positive  RNA and repeat CMP was ordered but has not been done  GI consult scheduled for 4/20  Review of Systems  Constitutional: Negative for activity change, appetite change, chills and fever  Respiratory: Negative for shortness of breath  Cardiovascular: Negative for chest pain  Gastrointestinal: Negative for blood in stool, nausea and vomiting  Genitourinary: Negative for difficulty urinating and dysuria  Pertinent items are noted in chief complaint and HPI  Social History   reports that she has never smoked  She has never used smokeless tobacco     reports no history of alcohol use  reports current drug use  Drugs: Fentanyl and Heroin  Objective  Physical Exam  Vitals signs reviewed  Constitutional:       Appearance: Normal appearance  She is well-developed  Cardiovascular:      Heart sounds: Normal heart sounds  Pulmonary:      Effort: Pulmonary effort is normal       Breath sounds: Normal breath sounds  Neurological:      Mental Status: She is oriented to person, place, and time      Abdomen soft, non-tender, non-distended      Chart Review/Health Maintenance   The following have been updated: medications    No Known Allergies  Current Outpatient Medications:     clonazePAM (KlonoPIN) 1 mg tablet, Take 1 mg by mouth daily , Disp: , Rfl:     zolpidem (AMBIEN) 10 mg tablet, Take 10 mg by mouth daily at bedtime, Disp: , Rfl:     Patient Active Problem List   Diagnosis    Anxiety    Von Willebrand disease ([facility] )    Asthma    Heroin abuse ([facility] )    Acute hepatitis A     Past Surgical History:   Procedure Laterality Date    APPENDECTOMY      CHOLECYSTECTOMY      PARTIAL HYSTERECTOMY       No family history on file    Health Maintenance   Topic Date Due    Medicare Annual Wellness Visit (AWV)  Never done    Pneumococcal Vaccine: Pediatrics (0 to 5 Years) and At-Risk Patients (6 to 59 Years) (1 of 1 - PPSV23) Never done    BMI: Followup Plan  Never done    DTaP,Tdap,and Td Vaccines (1 - Tdap) Never done    Cervical Cancer Screening  Never done    Influenza Vaccine (1) Never done    Depression Screening PHQ  03/25/2022    BMI: Adult  03/25/2022    HIV Screening  Completed    Hepatitis C Screening  Completed    HIB Vaccine  Aged Out    Hepatitis B Vaccine  Aged Out    IPV Vaccine  Aged Out    Hepatitis A Vaccine  Aged Out    Meningococcal ACWY Vaccine  Aged Out    HPV Vaccine  Aged Out

## 2021-03-31 ENCOUNTER — TELEPHONE (OUTPATIENT)
Dept: PSYCHIATRY | Facility: CLINIC | Age: 36
End: 2021-03-31

## 2021-03-31 ENCOUNTER — TELEPHONE (OUTPATIENT)
Dept: SURGICAL ONCOLOGY | Facility: CLINIC | Age: 36
End: 2021-03-31

## 2021-03-31 NOTE — TELEPHONE ENCOUNTER
New Patient Encounter    New Patient Intake Form   Patient Details:  William Nieto  1985  [id number]    Background Information:  [address] starts by opening a telephone encounter and gathering the following information   Who is calling to schedule? If not self, relationship to patient? self   Referring Provider Carly Aragon   What is the diagnosis? Von Willebrand disease   Is this diagnosis confirmed? Yes   When was the diagnosis? 10years old   Is there a confirmed diagnosis from a biopsy/tissue reviewed by pathology? na   Were outside slides requested? No   Is patient aware of diagnosis? Yes   Is there a personal history and what kind? No   Is there a family history and what kind? No   Reason for visit? New Diagnosis   Have you had any imaging or labs done? If so: when, where? yes  [facility]   Are records in [place]? yes   If patient has a prior history of breast cancer were old records obtained? NA   Was the patient told to bring a disk? No   Does the patient smoke or Vape? No   If yes, how many packs or cartridges per day? na   Scheduling Information:   Preferred [place]:  [address] [place] [address]     Are there any dates/time the patient cannot be seen? na   Miscellaneous: na   After completing the above information, please route to Financial Counselor and the appropriate Nurse Navigator for review

## 2021-03-31 NOTE — TELEPHONE ENCOUNTER
Valentino Elder is looking to schedule an appointment for a Psychiatrist and Therapist   Patient has [place] and can be reached anytime  Valentino Elder can be reached at [phone]

## 2021-04-05 NOTE — TELEPHONE ENCOUNTER
Behavorial Health Outpatient Intake Questions    Referred by: PCP    Please advised interviewee that they need to answer all questions truthfully to allow for best care and any misrepresentations of information may affect their ability to be seen at this clinic   => Was this discussed? Yes     [facility]     Presenting Problem (in patient's words): Patient reports anxiety and depression  She has trouble sleeping     Are there any developmental disabilities? ? If yes, can they speak to you on the phone? If they are too limited to speak to you on phone, refer out No    Are you taking any psychiatric medications? Yes    => If yes, who prescribes? If yes, are they injectable medications? Clonopin and [employer]    Does the patient have a language barrier or hearing impairment? No    Have you been treated at [address] [place] [address] by a therapist or a doctor in the past? If yes, who? No    Has the patient been hospitalized for mental health? No   If yes, how long ago was last hospitalization and where was it? Do you actively use alcohol or marijuana or illegal substances? If yes, what and how much - refer out to Drug and alcohol treatment if use is excessive or daily use of illegal substances No concerns of substance abuse are reported  Do you have a community treatment team or ? No    Legal History-     Does the patient have any history of arrests, correction/half-way time, or DUIs? No  If Yes-  1) What types of charges? 2) When were they last incarcerated? 3) Are they currently on parole or probation? Minor Child-    Who has custody of the child? Is there a custody agreement? If there is a custody agreement remind parent that they must bring a copy to the first appt or they will not be seen       Intake Team, please check with provider before scheduling if flags come up such as:  - complex case  - legal history (other than DUI)  - communication barrier concerns are present  - if, in your judgment, this needs further review    ACCEPTED as a patient Yes  => Appointment Date: 5/17/21 at 9:00 am with Dr Davis Butts? No    Name of Insurance Co: [employer] ID# [id number]  [id number] Phone #  If ins is primary or secondary  If patient is a minor, parents information such as Name, D  O B of guarantor      [facility]   [phone]

## 2021-04-06 NOTE — TELEPHONE ENCOUNTER
Left message for patient about canceling appts  Sending a reference sheet with some dual diagnosis centers to patient as well       Thank you

## 2021-04-07 ENCOUNTER — APPOINTMENT (OUTPATIENT)
Dept: LAB | Facility: HOSPITAL | Age: 36
End: 2021-04-07
Attending: INTERNAL MEDICINE
Payer: COMMERCIAL

## 2021-04-07 ENCOUNTER — TELEPHONE (OUTPATIENT)
Dept: PSYCHIATRY | Facility: CLINIC | Age: 36
End: 2021-04-07

## 2021-04-07 DIAGNOSIS — R76.8 HEPATITIS C ANTIBODY TEST POSITIVE: ICD-10-CM

## 2021-04-07 DIAGNOSIS — R74.01 TRANSAMINITIS: ICD-10-CM

## 2021-04-07 DIAGNOSIS — B15.9 ACUTE HEPATITIS A: ICD-10-CM

## 2021-04-07 LAB
ALBUMIN SERPL BCP-MCNC: 3.9 G/DL (ref 3–5.2)
ALP SERPL-CCNC: 84 U/L (ref 43–122)
ALT SERPL W P-5'-P-CCNC: 51 U/L
ANION GAP SERPL CALCULATED.3IONS-SCNC: 7 MMOL/L (ref 5–14)
AST SERPL W P-5'-P-CCNC: 81 U/L (ref 14–36)
BILIRUB SERPL-MCNC: 0.8 MG/DL
BUN SERPL-MCNC: 10 MG/DL (ref 5–25)
CALCIUM SERPL-MCNC: 9.6 MG/DL (ref 8.4–10.2)
CHLORIDE SERPL-SCNC: 104 MMOL/L (ref 97–108)
CO2 SERPL-SCNC: 29 MMOL/L (ref 22–30)
CREAT SERPL-MCNC: 0.81 MG/DL (ref 0.6–1.2)
GFR SERPL CREATININE-BSD FRML MDRD: 92 ML/MIN/1.73SQ M
GLUCOSE SERPL-MCNC: 111 MG/DL (ref 70–99)
POTASSIUM SERPL-SCNC: 3.9 MMOL/L (ref 3.6–5)
PROT SERPL-MCNC: 8.4 G/DL (ref 5.9–8.4)
SODIUM SERPL-SCNC: 140 MMOL/L (ref 137–147)

## 2021-04-07 PROCEDURE — 87521 HEPATITIS C PROBE&RVRS TRNSC: CPT

## 2021-04-07 PROCEDURE — 36415 COLL VENOUS BLD VENIPUNCTURE: CPT

## 2021-04-07 PROCEDURE — 80053 COMPREHEN METABOLIC PANEL: CPT

## 2021-04-12 LAB — HCV RNA SERPL QL NAA+PROBE: NEGATIVE

## 2021-04-19 ENCOUNTER — CONSULT (OUTPATIENT)
Dept: HEMATOLOGY ONCOLOGY | Facility: CLINIC | Age: 36
End: 2021-04-19
Payer: COMMERCIAL

## 2021-04-19 VITALS
RESPIRATION RATE: 16 BRPM | WEIGHT: 172.2 LBS | TEMPERATURE: 96.7 F | SYSTOLIC BLOOD PRESSURE: 112 MMHG | HEIGHT: 62 IN | OXYGEN SATURATION: 96 % | DIASTOLIC BLOOD PRESSURE: 82 MMHG | BODY MASS INDEX: 31.69 KG/M2 | HEART RATE: 103 BPM

## 2021-04-19 DIAGNOSIS — R53.83 OTHER FATIGUE: ICD-10-CM

## 2021-04-19 DIAGNOSIS — D68.0 VON WILLEBRAND DISEASE (HCC): Primary | ICD-10-CM

## 2021-04-19 PROCEDURE — 99204 OFFICE O/P NEW MOD 45 MIN: CPT | Performed by: NURSE PRACTITIONER

## 2021-04-19 NOTE — PROGRESS NOTES
Hematology/Oncology Outpatient Follow-up  Errol Arevalo 43 y o  female 1985 [id number]    Date:  4/19/2021      Assessment and Plan:  1  Von Willebrand disease ([facility] [address] )  Patient reports that she was diagnosed with von Willebrand's disorder which he was 11years old in [place]; was on DDAVP nasal spray at 1 point in time  Her mother also has a history of the same  No reported significant bleeding events in the past  other than menorrhagia which resolved after her partial hysterectomy in 2003  Patient was told that we will send her to have the usual workup done to confirm diagnosis  She will be back for follow-up to discuss the findings of her laboratory studies in about 4-6 weeks  - CBC and differential; Future  - Comprehensive metabolic panel; Future  - APTT; Future  - Protime-INR; Future  - VWF Multimeric Panel; Future  - Fibrinogen; Future    2  Other fatigue  Patient is reporting some fatigue/sleeping a lot for completeness sake we will order additional workup  - CBC and differential; Future  - Folate; Future  - Vitamin B12; Future  - Vitamin D 25 hydroxy; Future  - TSH, 3rd generation with Free T4 reflex; Future  - Iron Panel (Includes Ferritin, Iron Sat%, Iron, and TIBC); Future  - Ferritin; Future      HPI:  Patient is a pleasant 61-year-old female who presents to establish hematological care for her von Willebrand's disorder  She has a past medical history of asthma, anxiety, untreated hepatitis-C and IV drug use particularly heroin  She states that she has not used drugs in over a week and will likely be starting Suboxone in the near future  She apparently was admitted to the hospital recently 3/10 through 3/11 and found to have acute hepatitis A infection; is scheduled to see Gastroenterology tomorrow  She states that she was diagnosed with von Willebrand's disorder when she was 11years old in [place]  Her mother also has von Willebrand's disorder    She did have DDAVP nose spray at 1 point in time  She has not had any significant bleeding events in the past according to her she has had dental extractions, appendectomy and has birth 3 children without any bleeding complications; does not remember if she was using the DDAVP around the time of her procedures or not  Does admit to easy bruising  States that she did have menorrhagia in the past but has not had her menstrual cycle since her partial hysterectomy around 2003  Today patient is reporting fatigue/ sleeping a lot but otherwise has no new complaints  Her most recent CMP from 04/07/2021 showed significant improvement in her transaminitis AST 81, ALT 51, glucose 111 otherwise normal CMP  Additional laboratory studies done during her hospital admission 03/2021 showed negative HIV, normal coags and normal CBC hemoglobin 13 2  Her hepatitis panel was reactive for hep A IgM and low reactive for hepatitis C antibody  She did have a CTA of the chest with abdominal and pelvic with contrast 03/10/2021 which showed:  IMPRESSION:  Unremarkable chest     Mild inflammation along both sides of the colon with small amount of free fluid in the left side of abdomen near the colon and also in the pelvis  Findings are nonspecific but may suggest a colitis     1 cm indeterminate low-density lesion in the right side of the uterus which might be further assessed with nonemergent follow-up pelvic ultrasound      L5-S1 disc herniation is suggested in the posterior midline extending behind L5 vertebral body with some central canal narrowing noted  Consider follow-up MRI of the lumbar spine on a nonemergent basis for better characterization  The lesion on the right side of her uterus is apparently not new and was reported as mildly atrial simple cyst in the past on transvaginal ultrasound at [facility] in 2019  ROS: Review of Systems   Constitutional: Positive for fatigue   Negative for activity change, appetite change, chills, fever and unexpected weight change  HENT: Negative for congestion, mouth sores, nosebleeds, sore throat and trouble swallowing  Eyes: Negative  Respiratory: Negative for cough, chest tightness and shortness of breath  Cardiovascular: Negative for chest pain, palpitations and leg swelling  Gastrointestinal: Negative for abdominal distention, abdominal pain, blood in stool, constipation, diarrhea, nausea and vomiting  Genitourinary: Negative for difficulty urinating, dysuria, frequency, hematuria and urgency  Musculoskeletal: Negative for arthralgias, back pain, gait problem, joint swelling and myalgias  Skin: Negative for color change, pallor and rash  Neurological: Negative for dizziness, weakness, light-headedness, numbness and headaches  Hematological: Negative for adenopathy  Bruises/bleeds easily (easy brusing)  Psychiatric/Behavioral: Negative for dysphoric mood and sleep disturbance  The patient is not nervous/anxious          Past Medical History:   Diagnosis Date    Anxiety     Asthma     Intravenous drug abuse ([facility] )     Von Willebrand disease ([facility] )        Past Surgical History:   Procedure Laterality Date    APPENDECTOMY      CHOLECYSTECTOMY      PARTIAL HYSTERECTOMY         Social History     Socioeconomic History    Marital status: Single     Spouse name: None    Number of children: None    Years of education: None    Highest education level: None   Occupational History    None   Social Needs    Financial resource strain: None    Food insecurity     Worry: None     Inability: None    Transportation needs     Medical: None     Non-medical: None   Tobacco Use    Smoking status: Never Smoker    Smokeless tobacco: Never Used   Substance and Sexual Activity    Alcohol use: Never     Frequency: Never    Drug use: Yes     Types: Fentanyl, Heroin     Comment: "shoots 11 bags a day"    Sexual activity: Yes     Partners: Male   Lifestyle    Physical activity     Days per week: None Minutes per session: None    Stress: None   Relationships    Social connections     Talks on phone: None     Gets together: None     Attends Hoahaoism service: None     Active member of club or organization: None     Attends meetings of clubs or organizations: None     Relationship status: None    Intimate partner violence     Fear of current or ex partner: None     Emotionally abused: None     Physically abused: None     Forced sexual activity: None   Other Topics Concern    None   Social History Narrative    None       No family history on file  No Known Allergies      Current Outpatient Medications:     clonazePAM (KlonoPIN) 1 mg tablet, Take 1 mg by mouth daily , Disp: , Rfl:     zolpidem (AMBIEN) 10 mg tablet, Take 10 mg by mouth daily at bedtime, Disp: , Rfl:       Physical Exam:  /82 (BP Location: Left arm, Patient Position: Sitting, Cuff Size: Adult)   Pulse 103   Temp (!) 96 7 °F (35 9 °C) (Tympanic)   Resp 16   Ht 5' 2" (1 575 m)   Wt 78 1 kg (172 lb 3 2 oz)   SpO2 96%   BMI 31 50 kg/m²     Physical Exam  Vitals signs reviewed  Constitutional:       General: She is not in acute distress  Appearance: She is well-developed  She is not diaphoretic  HENT:      Head: Normocephalic and atraumatic  Eyes:      General: No scleral icterus  Conjunctiva/sclera: Conjunctivae normal       Pupils: Pupils are equal, round, and reactive to light  Neck:      Musculoskeletal: Normal range of motion and neck supple  Thyroid: No thyromegaly  Cardiovascular:      Rate and Rhythm: Regular rhythm  Tachycardia present  Heart sounds: Normal heart sounds  No murmur  Pulmonary:      Effort: Pulmonary effort is normal  No respiratory distress  Breath sounds: Normal breath sounds  Abdominal:      General: There is no distension  Palpations: Abdomen is soft  There is no hepatomegaly or splenomegaly  Tenderness: There is no abdominal tenderness     Musculoskeletal: Normal range of motion  General: No swelling  Lymphadenopathy:      Cervical: No cervical adenopathy  Upper Body:      Right upper body: No axillary adenopathy  Left upper body: No axillary adenopathy  Skin:     General: Skin is warm and dry  Coloration: Skin is pale  Findings: No erythema or rash  Neurological:      General: No focal deficit present  Mental Status: She is alert and oriented to person, place, and time  Psychiatric:         Mood and Affect: Mood and affect normal          Behavior: Behavior normal  Behavior is cooperative  Thought Content: Thought content normal          Judgment: Judgment normal            Labs:  Lab Results   Component Value Date    WBC 7 60 03/10/2021    HGB 13 2 03/10/2021    HCT 40 2 03/10/2021    MCV 85 03/10/2021     03/10/2021     Lab Results   Component Value Date    K 3 9 04/07/2021     04/07/2021    CO2 29 04/07/2021    BUN 10 04/07/2021    CREATININE 0 81 04/07/2021    CALCIUM 9 6 04/07/2021    AST 81 (H) 04/07/2021    ALT 51 (H) 04/07/2021    ALKPHOS 84 04/07/2021    EGFR 92 04/07/2021       Patient voiced understanding and agreement in the above discussion  Aware to contact our office with questions/symptoms in the interim  This note has been generated by voice recognition software system  Therefore, there may be spelling, grammar, and or syntax errors  Please contact if questions arise

## 2021-04-20 ENCOUNTER — OFFICE VISIT (OUTPATIENT)
Dept: GASTROENTEROLOGY | Facility: MEDICAL CENTER | Age: 36
End: 2021-04-20
Payer: COMMERCIAL

## 2021-04-20 VITALS
HEART RATE: 76 BPM | WEIGHT: 176.2 LBS | SYSTOLIC BLOOD PRESSURE: 108 MMHG | TEMPERATURE: 98.4 F | DIASTOLIC BLOOD PRESSURE: 64 MMHG | BODY MASS INDEX: 32.23 KG/M2

## 2021-04-20 DIAGNOSIS — R79.89 ELEVATED LFTS: ICD-10-CM

## 2021-04-20 DIAGNOSIS — B15.9 ACUTE HEPATITIS A: Primary | ICD-10-CM

## 2021-04-20 PROCEDURE — 1036F TOBACCO NON-USER: CPT | Performed by: PHYSICIAN ASSISTANT

## 2021-04-20 PROCEDURE — 99214 OFFICE O/P EST MOD 30 MIN: CPT | Performed by: PHYSICIAN ASSISTANT

## 2021-04-20 NOTE — PROGRESS NOTES
Assessment/Plan:     Diagnoses and all orders for this visit:    Acute hepatitis A  Elevated LFTs    Patient presents to the hospital last month with jaundice and was found to have elevated LFTs and acute hepatitis a  Her LFTs are slowly improving  She is asymptomatic  She was also found to have a positive hep C Ab, but her viral load was nondetectable likely indicating she was previously exposed and cleared the infection on her own  Would recommend rechecking her LFTs in approximately 2-3 months to make sure they normalized otherwise no further intervention is warranted  -     Ambulatory referral to Gastroenterology  -     Comprehensive metabolic panel; Future        Will see her back on an as-needed basis  Subjective:      Patient ID: Pepe Mccann is a 43 y o  female  HPI       This is a hospital follow-up for elevated LFTs  Patient presented on 03/11 with jaundice  She denied nausea or vomiting but had a poor appetite  No changes in bowels  She was found to have elevated LFTs, AST was 800, AST was a 1000 &  Total bilirubin 5 1  Acute hepatitis panel showed acute hep a and hep C was also positive  She does admit to IV drug use in the past but states she is currently clean  Her LFTs were recheck on 04/07, AST was 81, ALT was 51, total bilirubin was 0 8  Hep C viral load was also checked which was undetectable  She is no longer having any GI symptoms  Patient Active Problem List   Diagnosis    Anxiety    [facility]brand disease ([facility] [address] )    Asthma    [facility]    Acute hepatitis A     No Known Allergies  Current Outpatient Medications on File Prior to Visit   Medication Sig    clonazePAM (KlonoPIN) 1 mg tablet Take 1 mg by mouth daily     zolpidem (AMBIEN) 10 mg tablet Take 10 mg by mouth daily at bedtime     No current facility-administered medications on file prior to visit  History reviewed  No pertinent family history    Past Medical History:   Diagnosis Date    Anxiety     Asthma     Intravenous drug abuse ([facility] )     Von Willebrand disease ([facility] )      Social History     Socioeconomic History    Marital status: Single     Spouse name: None    Number of children: None    Years of education: None    Highest education level: None   Occupational History    None   Social Needs    Financial resource strain: None    Food insecurity     Worry: None     Inability: None    Transportation needs     Medical: None     Non-medical: None   Tobacco Use    Smoking status: Never Smoker    Smokeless tobacco: Never Used   Substance and Sexual Activity    Alcohol use: Never     Frequency: Never    Drug use: Yes     Types: Fentanyl, Heroin     Comment: "shoots 11 bags a day"    Sexual activity: Yes     Partners: Male   Lifestyle    Physical activity     Days per week: None     Minutes per session: None    Stress: None   Relationships    Social connections     Talks on phone: None     Gets together: None     Attends Yazdanism service: None     Active member of club or organization: None     Attends meetings of clubs or organizations: None     Relationship status: None    Intimate partner violence     Fear of current or ex partner: None     Emotionally abused: None     Physically abused: None     Forced sexual activity: None   Other Topics Concern    None   Social History Narrative    None     Past Surgical History:   Procedure Laterality Date    APPENDECTOMY      CHOLECYSTECTOMY      PARTIAL HYSTERECTOMY           Review of Systems   All other systems reviewed and are negative  Objective:      /64   Pulse 76   Temp 98 4 °F (36 9 °C)   Wt 79 9 kg (176 lb 3 2 oz)   BMI 32 23 kg/m²          Physical Exam  Constitutional:       Appearance: Normal appearance  She is well-developed  HENT:      Head: Normocephalic and atraumatic  Eyes:      Conjunctiva/sclera: Conjunctivae normal    Neck:      Musculoskeletal: Normal range of motion     Cardiovascular: Rate and Rhythm: Normal rate and regular rhythm  Pulmonary:      Effort: Pulmonary effort is normal       Breath sounds: Normal breath sounds  Abdominal:      General: Bowel sounds are normal  There is no distension  Palpations: Abdomen is soft  Tenderness: There is no abdominal tenderness  Musculoskeletal: Normal range of motion  Skin:     General: Skin is warm and dry  Neurological:      Mental Status: She is alert and oriented to person, place, and time     Psychiatric:         Mood and Affect: Mood normal          Behavior: Behavior normal

## 2021-04-22 ENCOUNTER — OFFICE VISIT (OUTPATIENT)
Dept: FAMILY MEDICINE CLINIC | Facility: CLINIC | Age: 36
End: 2021-04-22

## 2021-04-22 ENCOUNTER — PATIENT OUTREACH (OUTPATIENT)
Dept: FAMILY MEDICINE CLINIC | Facility: CLINIC | Age: 36
End: 2021-04-22

## 2021-04-22 VITALS
HEART RATE: 89 BPM | WEIGHT: 175 LBS | DIASTOLIC BLOOD PRESSURE: 60 MMHG | OXYGEN SATURATION: 96 % | HEIGHT: 62 IN | BODY MASS INDEX: 32.2 KG/M2 | SYSTOLIC BLOOD PRESSURE: 102 MMHG | RESPIRATION RATE: 16 BRPM | TEMPERATURE: 97 F

## 2021-04-22 DIAGNOSIS — D68.0 VON WILLEBRAND DISEASE (HCC): ICD-10-CM

## 2021-04-22 DIAGNOSIS — F11.90 HEROIN USE: Primary | ICD-10-CM

## 2021-04-22 PROCEDURE — 3008F BODY MASS INDEX DOCD: CPT | Performed by: FAMILY MEDICINE

## 2021-04-22 PROCEDURE — 1036F TOBACCO NON-USER: CPT | Performed by: FAMILY MEDICINE

## 2021-04-22 PROCEDURE — 99213 OFFICE O/P EST LOW 20 MIN: CPT | Performed by: FAMILY MEDICINE

## 2021-04-22 PROCEDURE — 3008F BODY MASS INDEX DOCD: CPT | Performed by: PHYSICIAN ASSISTANT

## 2021-04-22 NOTE — PROGRESS NOTES
Chief Complaint   Patient presents with    Follow-up     Blood Pressure: 102/60, Pulse: 89, Respirations: 16, Temperature: (!) 97 °F (36 1 °C), Temp Source: Temporal, SpO2: 96 %, Weight - Scale: 79 4 kg (175 lb), Height: 5' 2" (157 5 cm), Pain Score: 0-No pain    Assessment/Plan  1  Follow-up as scheduled at Suboxone clinic  Encouraged her to contact me if any problem  2  Follow-up with hematology for re-evaluation of von Willebrand's disease  Blood draw due on May 3rd  At that time we will check her CMP is well to make sure that her liver chemistries are coming down appropriately  3  RTO in 1 month to follow-up on above  After her opiate use disorder is treated we will explore health maintenance options  The above was discussed in layman's terms, the patient was engaged using the shared-decision making process, verbalized understanding of the treatment plan, and all questions were answered to the patient's satisfaction  Diagnoses and all orders for this visit:    Heroin use    Von Willebrand disease ([facility] [address] )          Subjective/HPI  1  Patient is well known to me  Medical history of anxiety and depression, taking Klonopin and Ambien, last RX'd 9/17/2020  Asthma using albuterol PRN 3x/week, no night time awakenings, never hospitalized for asthma  Second appointment with me  Initially seen for hospital follow-up due to acute hep A  Stopped IV heroin use due to concern from recent Hep C ab pos / viral load neg but switched to sniffing it  2 bundles a day  Plans on starting at local suboxone clinic this week  2  Saw Hematology for von Willebrand's disease  Workup is planned  Review of Systems  Constitutional: Negative for activity change, appetite change, chills and fever  Respiratory: Negative for shortness of breath  Cardiovascular: Negative for chest pain  Gastrointestinal: Negative for blood in stool, nausea and vomiting  Genitourinary: Negative for difficulty urinating and dysuria  Pertinent items are noted in chief complaint and HPI  Social History   reports that she has never smoked  She has never used smokeless tobacco     reports no history of alcohol use  reports current drug use  Drugs: Fentanyl and Heroin  Objective  Physical Exam  Vitals signs reviewed  Constitutional:       Appearance: Normal appearance  She is well-developed  Cardiovascular:      Heart sounds: Normal heart sounds  Pulmonary:      Effort: Pulmonary effort is normal       Breath sounds: Normal breath sounds  Neurological:      Mental Status: She is oriented to person, place, and time  Chart Review/Health Maintenance   The following have been updated: none    No Known Allergies  Current Outpatient Medications:     clonazePAM (KlonoPIN) 1 mg tablet, Take 1 mg by mouth daily , Disp: , Rfl:     zolpidem (AMBIEN) 10 mg tablet, Take 10 mg by mouth daily at bedtime, Disp: , Rfl:     Patient Active Problem List   Diagnosis    Anxiety    [facility]    Asthma    [facility]in abuse ([facility] )    Acute hepatitis A    Elevated LFTs     Past Surgical History:   Procedure Laterality Date    APPENDECTOMY      CHOLECYSTECTOMY      PARTIAL HYSTERECTOMY       No family history on file    Health Maintenance   Topic Date Due    Medicare Annual Wellness Visit (AWV)  Never done    Pneumococcal Vaccine: Pediatrics (0 to 5 Years) and At-Risk Patients (6 to 59 Years) (1 of 1 - PPSV23) Never done    COVID-19 Vaccine (1) Never done    BMI: Followup Plan  Never done    DTaP,Tdap,and Td Vaccines (1 - Tdap) Never done    Cervical Cancer Screening  Never done    Influenza Vaccine (1) Never done    Depression Screening PHQ  03/25/2022    BMI: Adult  04/20/2022    HIV Screening  Completed    Hepatitis C Screening  Completed    HIB Vaccine  Aged Out    Hepatitis B Vaccine  Aged Out    IPV Vaccine  Aged Out    Hepatitis A Vaccine  Aged Out    Meningococcal ACWY Vaccine  Aged Out    HPV Vaccine  Aged Out

## 2021-04-22 NOTE — LETTER
[address] [facility], [facility] Martha Matthew  [place]    Re:    5/5/2021       Dear Jair Santana,    We tried to reach you by phone on three occassions and was unfortunately unable to reach you  It is important that you contact the [facility]  as soon as possible at: [phone] to discuss outpatient mental health resources       Sincerely,         Ibrahima Pascual MSW, LSW

## 2021-05-05 NOTE — PROGRESS NOTES
KRISTINA VALADEZ attempted to contact Pt on three occassions however there was no response  KRISTINA VALADEZ left vm for returned call  KRISTINA VALADEZ will send letter to Pt home for further outreach attempts and close referral at this time  KRISTINA VALADEZ will remain available for additional assistance as needed

## 2021-05-17 ENCOUNTER — TELEPHONE (OUTPATIENT)
Dept: HEMATOLOGY ONCOLOGY | Facility: CLINIC | Age: 36
End: 2021-05-17

## 2021-05-19 ENCOUNTER — TELEPHONE (OUTPATIENT)
Dept: HEMATOLOGY ONCOLOGY | Facility: CLINIC | Age: 36
End: 2021-05-19

## 2021-07-07 ENCOUNTER — APPOINTMENT (OUTPATIENT)
Dept: LAB | Facility: HOSPITAL | Age: 36
End: 2021-07-07
Payer: COMMERCIAL

## 2021-07-07 DIAGNOSIS — R53.83 OTHER FATIGUE: ICD-10-CM

## 2021-07-07 DIAGNOSIS — D68.0 VON WILLEBRAND DISEASE (HCC): ICD-10-CM

## 2021-07-07 LAB
25(OH)D3 SERPL-MCNC: 14.5 NG/ML (ref 30–100)
ALBUMIN SERPL BCP-MCNC: 3.9 G/DL (ref 3–5.2)
ALP SERPL-CCNC: 70 U/L (ref 43–122)
ALT SERPL W P-5'-P-CCNC: 15 U/L
ANION GAP SERPL CALCULATED.3IONS-SCNC: 10 MMOL/L (ref 5–14)
APTT PPP: 30 SECONDS (ref 23–37)
AST SERPL W P-5'-P-CCNC: 29 U/L (ref 14–36)
BASOPHILS # BLD AUTO: 0 THOUSANDS/ΜL (ref 0–0.1)
BASOPHILS NFR BLD AUTO: 0 % (ref 0–1)
BILIRUB SERPL-MCNC: 0.63 MG/DL
BUN SERPL-MCNC: 19 MG/DL (ref 5–25)
CALCIUM SERPL-MCNC: 9.7 MG/DL (ref 8.4–10.2)
CHLORIDE SERPL-SCNC: 106 MMOL/L (ref 97–108)
CO2 SERPL-SCNC: 28 MMOL/L (ref 22–30)
CREAT SERPL-MCNC: 0.92 MG/DL (ref 0.6–1.2)
EOSINOPHIL # BLD AUTO: 0.2 THOUSAND/ΜL (ref 0–0.4)
EOSINOPHIL NFR BLD AUTO: 2 % (ref 0–6)
ERYTHROCYTE [DISTWIDTH] IN BLOOD BY AUTOMATED COUNT: 13.4 %
FERRITIN SERPL-MCNC: 65 NG/ML (ref 8–388)
FIBRINOGEN PPP-MCNC: 407 MG/DL (ref 227–495)
FOLATE SERPL-MCNC: 5.4 NG/ML (ref 3.1–17.5)
GFR SERPL CREATININE-BSD FRML MDRD: 79 ML/MIN/1.73SQ M
GLUCOSE P FAST SERPL-MCNC: 107 MG/DL (ref 70–99)
HCT VFR BLD AUTO: 39.5 % (ref 36–46)
HGB BLD-MCNC: 13.3 G/DL (ref 12–16)
INR PPP: 0.96 (ref 0.84–1.19)
IRON SATN MFR SERPL: 25 %
IRON SERPL-MCNC: 80 UG/DL (ref 50–170)
LYMPHOCYTES # BLD AUTO: 3.8 THOUSANDS/ΜL (ref 0.5–4)
LYMPHOCYTES NFR BLD AUTO: 41 % (ref 25–45)
MCH RBC QN AUTO: 29.1 PG (ref 26–34)
MCHC RBC AUTO-ENTMCNC: 33.7 G/DL (ref 31–36)
MCV RBC AUTO: 86 FL (ref 80–100)
MONOCYTES # BLD AUTO: 0.6 THOUSAND/ΜL (ref 0.2–0.9)
MONOCYTES NFR BLD AUTO: 6 % (ref 1–10)
NEUTROPHILS # BLD AUTO: 4.7 THOUSANDS/ΜL (ref 1.8–7.8)
NEUTS SEG NFR BLD AUTO: 50 % (ref 45–65)
PLATELET # BLD AUTO: 222 THOUSANDS/UL (ref 150–450)
PMV BLD AUTO: 8.8 FL (ref 8.9–12.7)
POTASSIUM SERPL-SCNC: 4 MMOL/L (ref 3.6–5)
PROT SERPL-MCNC: 7.5 G/DL (ref 5.9–8.4)
PROTHROMBIN TIME: 12.9 SECONDS (ref 11.6–14.5)
RBC # BLD AUTO: 4.58 MILLION/UL (ref 4–5.2)
SODIUM SERPL-SCNC: 144 MMOL/L (ref 137–147)
TIBC SERPL-MCNC: 314 UG/DL (ref 250–450)
TSH SERPL DL<=0.05 MIU/L-ACNC: 3.09 UIU/ML (ref 0.47–4.68)
VIT B12 SERPL-MCNC: 303 PG/ML (ref 100–900)
WBC # BLD AUTO: 9.3 THOUSAND/UL (ref 4.5–11)

## 2021-07-07 PROCEDURE — 82728 ASSAY OF FERRITIN: CPT

## 2021-07-07 PROCEDURE — 82306 VITAMIN D 25 HYDROXY: CPT

## 2021-07-07 PROCEDURE — 85025 COMPLETE CBC W/AUTO DIFF WBC: CPT

## 2021-07-07 PROCEDURE — 85240 CLOT FACTOR VIII AHG 1 STAGE: CPT

## 2021-07-07 PROCEDURE — 80053 COMPREHEN METABOLIC PANEL: CPT

## 2021-07-07 PROCEDURE — 83540 ASSAY OF IRON: CPT

## 2021-07-07 PROCEDURE — 85246 CLOT FACTOR VIII VW ANTIGEN: CPT

## 2021-07-07 PROCEDURE — 85247 CLOT FACTOR VIII MULTIMETRIC: CPT

## 2021-07-07 PROCEDURE — 85610 PROTHROMBIN TIME: CPT

## 2021-07-07 PROCEDURE — 84443 ASSAY THYROID STIM HORMONE: CPT

## 2021-07-07 PROCEDURE — 83550 IRON BINDING TEST: CPT

## 2021-07-07 PROCEDURE — 36415 COLL VENOUS BLD VENIPUNCTURE: CPT

## 2021-07-07 PROCEDURE — 82607 VITAMIN B-12: CPT

## 2021-07-07 PROCEDURE — 82746 ASSAY OF FOLIC ACID SERUM: CPT

## 2021-07-07 PROCEDURE — 85384 FIBRINOGEN ACTIVITY: CPT

## 2021-07-07 PROCEDURE — 85730 THROMBOPLASTIN TIME PARTIAL: CPT

## 2021-07-07 PROCEDURE — 85245 CLOT FACTOR VIII VW RISTOCTN: CPT

## 2021-07-09 LAB
FACT XIIIA PPP-ACNC: 156 % (ref 56–140)
VWF AG ACT/NOR PPP IA: 172 % (ref 50–200)
VWF:RCO ACT/NOR PPP PL AGG: 127 % (ref 50–200)

## 2021-07-13 NOTE — TELEPHONE ENCOUNTER
Scheduling Appointment     Who Is Calling to [address] [place] [address]   Location [place]   Date and Time 09/01/21 at 1:30 pm          Patient verbalized understanding  Patient rescheduled missed appt from 05/19/21- understood new appt time & date

## 2021-07-21 LAB — VWF MULTIMERS PPP IB: NORMAL

## 2021-08-25 DIAGNOSIS — R79.89 LOW VITAMIN D LEVEL: Primary | ICD-10-CM

## 2021-08-26 RX ORDER — ERGOCALCIFEROL 1.25 MG/1
50000 CAPSULE ORAL WEEKLY
Qty: 12 CAPSULE | Refills: 0 | Status: SHIPPED | OUTPATIENT
Start: 2021-08-26

## 2021-08-30 ENCOUNTER — TELEPHONE (OUTPATIENT)
Dept: HEMATOLOGY ONCOLOGY | Facility: CLINIC | Age: 36
End: 2021-08-30

## 2021-08-30 NOTE — TELEPHONE ENCOUNTER
Lab Orders in [facility]     Patient called to have labs placed in [facility]     Person calling in Patient    Date Of Appointment  09/01/2021   Call back number [phone]

## 2021-08-30 NOTE — TELEPHONE ENCOUNTER
Will send to MA to confirm what labs are needed prior to upcoming appointment with Edilson Blue were drawn after last follow up appointment in July

## 2021-08-31 ENCOUNTER — TELEPHONE (OUTPATIENT)
Dept: HEMATOLOGY ONCOLOGY | Facility: CLINIC | Age: 36
End: 2021-08-31

## 2021-08-31 NOTE — TELEPHONE ENCOUNTER
I spoke with patient in regards to concerns about July labs being good for appointment scheduled tomorrow 9/1/21  Informed patient that I spoke with Radha Sylvester and she states that her July labs will be fine  Informed patient if she has any other questions or concerns to give the office a call at [phone]

## 2021-09-01 ENCOUNTER — TELEPHONE (OUTPATIENT)
Dept: HEMATOLOGY ONCOLOGY | Facility: CLINIC | Age: 36
End: 2021-09-01

## 2021-09-01 NOTE — TELEPHONE ENCOUNTER
Left message that she missed  Her appt with Tina and to call  [phone] if she would like  To rs

## 2021-09-06 ENCOUNTER — HOSPITAL ENCOUNTER (EMERGENCY)
Facility: HOSPITAL | Age: 36
Discharge: HOME | End: 2021-09-06
Attending: EMERGENCY MEDICINE
Payer: COMMERCIAL

## 2021-09-06 VITALS
RESPIRATION RATE: 18 BRPM | DIASTOLIC BLOOD PRESSURE: 72 MMHG | BODY MASS INDEX: 34.75 KG/M2 | HEART RATE: 97 BPM | TEMPERATURE: 98.2 F | WEIGHT: 190 LBS | SYSTOLIC BLOOD PRESSURE: 103 MMHG | OXYGEN SATURATION: 100 %

## 2021-09-06 DIAGNOSIS — M54.30 SCIATICA: Primary | ICD-10-CM

## 2021-09-06 LAB
EXT PREG TEST URINE: NEGATIVE
EXT. CONTROL ED NAV: NORMAL

## 2021-09-06 PROCEDURE — 96372 THER/PROPH/DIAG INJ SC/IM: CPT

## 2021-09-06 PROCEDURE — 99284 EMERGENCY DEPT VISIT MOD MDM: CPT | Performed by: EMERGENCY MEDICINE

## 2021-09-06 PROCEDURE — 81025 URINE PREGNANCY TEST: CPT | Performed by: EMERGENCY MEDICINE

## 2021-09-06 PROCEDURE — 99283 EMERGENCY DEPT VISIT LOW MDM: CPT

## 2021-09-06 RX ORDER — KETOROLAC TROMETHAMINE 30 MG/ML
30 INJECTION, SOLUTION INTRAMUSCULAR; INTRAVENOUS ONCE
Status: COMPLETED | OUTPATIENT
Start: 2021-09-06 | End: 2021-09-06

## 2021-09-06 RX ORDER — DIAZEPAM 5 MG/1
5 TABLET ORAL ONCE
Status: COMPLETED | OUTPATIENT
Start: 2021-09-06 | End: 2021-09-06

## 2021-09-06 RX ORDER — KETOROLAC TROMETHAMINE 10 MG/1
10 TABLET, FILM COATED ORAL EVERY 6 HOURS PRN
Qty: 10 TABLET | Refills: 0 | Status: SHIPPED | OUTPATIENT
Start: 2021-09-06 | End: 2021-09-26 | Stop reason: SDUPTHER

## 2021-09-06 RX ORDER — CYCLOBENZAPRINE HCL 10 MG
10 TABLET ORAL 2 TIMES DAILY PRN
Qty: 20 TABLET | Refills: 0 | Status: SHIPPED | OUTPATIENT
Start: 2021-09-06 | End: 2021-09-26 | Stop reason: SDUPTHER

## 2021-09-06 RX ADMIN — DIAZEPAM 5 MG: 5 TABLET ORAL at 14:27

## 2021-09-06 RX ADMIN — KETOROLAC TROMETHAMINE 30 MG: 30 INJECTION, SOLUTION INTRAMUSCULAR; INTRAVENOUS at 14:28

## 2021-09-06 NOTE — ED PROVIDER NOTES
History  Chief Complaint   Patient presents with    Leg Pain     over 1 month, right side from buttocks traveling down     77-year-old female presents with approximately one-week history of pain and her right buttocks radiating into her right leg  She denies any numbness, weakness, tingling  She has a history of disc and back issues in the past   She denies any new trauma  Pain is worse with palpation and with movement  She denies any fever/chills, incontinence, or other acute issues at this time  Leg Pain  Location:  Buttock and leg  Injury: no    Buttock location:  R buttock  Leg location:  R leg  Pain details:     Quality:  Shooting and throbbing    Radiates to:  R leg    Severity:  Moderate    Onset quality:  Gradual    Timing:  Constant    Progression:  Waxing and waning  Chronicity:  New  Dislocation: no    Prior injury to area:  No  Relieved by:  Nothing  Worsened by:  Bearing weight  Ineffective treatments:  Acetaminophen and NSAIDs  Associated symptoms: stiffness    Associated symptoms: no back pain, no decreased ROM, no fatigue, no fever, no itching, no muscle weakness, no neck pain, no numbness, no swelling and no tingling        Prior to Admission Medications   Prescriptions Last Dose Informant Patient Reported? Taking? clonazePAM (KlonoPIN) 1 mg tablet   Yes No   Sig: Take 1 mg by mouth daily    ergocalciferol (VITAMIN D2) 50,000 units   No No   Sig: Take 1 capsule (50,000 Units total) by mouth once a week For 12 weeks then go to OTC Vit D 2,000 units daily   zolpidem (AMBIEN) 10 mg tablet   Yes No   Sig: Take 10 mg by mouth daily at bedtime      Facility-Administered Medications: None       Past Medical History:   Diagnosis Date    Anxiety     Asthma     Intravenous drug abuse ([facility] )     Von Willebrand disease ([facility] )        Past Surgical History:   Procedure Laterality Date    APPENDECTOMY      CHOLECYSTECTOMY      PARTIAL HYSTERECTOMY         History reviewed   No pertinent family history  I have reviewed and agree with the history as documented  E-Cigarette/Vaping    E-Cigarette Use Never User      E-Cigarette/Vaping Substances     Social History     Tobacco Use    Smoking status: Never Smoker    Smokeless tobacco: Never Used    Tobacco comment: occasional   Vaping Use    Vaping Use: Never used   Substance Use Topics    Alcohol use: Never    Drug use: Yes     Types: Fentanyl, Heroin     Comment: 3 days       Review of Systems   Constitutional: Negative for fatigue and fever  Musculoskeletal: Positive for gait problem and stiffness  Negative for back pain, joint swelling and neck pain  Skin: Negative for color change, itching and wound  Neurological: Negative for weakness and numbness  Hematological: Does not bruise/bleed easily  All other systems reviewed and are negative  Physical Exam  Physical Exam  Vitals and nursing note reviewed  Constitutional:       Appearance: Normal appearance  She is well-developed  She is not ill-appearing, toxic-appearing or diaphoretic  HENT:      Head: Normocephalic  Right Ear: External ear normal       Left Ear: External ear normal       Nose: Nose normal    Eyes:      General: No scleral icterus  Pulmonary:      Effort: Pulmonary effort is normal  No respiratory distress  Musculoskeletal:        Legs:       Comments: Pain at the sciatic notch on the right side which causes radiation of pain into the right leg  Skin:     General: Skin is warm and dry  Neurological:      General: No focal deficit present  Mental Status: She is alert and oriented to person, place, and time  Sensory: Sensation is intact  No sensory deficit  Motor: No weakness  Coordination: Coordination is intact  Gait: Gait abnormal (due to pain)  Deep Tendon Reflexes:      Reflex Scores:       Patellar reflexes are 2+ on the right side and 2+ on the left side    Psychiatric:         Mood and Affect: Mood normal  Behavior: Behavior normal          Vital Signs  ED Triage Vitals [09/06/21 1403]   Temperature Pulse Respirations Blood Pressure SpO2   98 2 °F (36 8 °C) 97 18 103/72 100 %      Temp Source Heart Rate Source Patient Position - Orthostatic VS BP Location FiO2 (%)   Tympanic Monitor -- -- --      Pain Score       9           Vitals:    09/06/21 1403   BP: 103/72   Pulse: 97         Visual Acuity      ED Medications  Medications   ketorolac (TORADOL) injection 30 mg (30 mg Intramuscular Given 9/6/21 1428)   diazepam (VALIUM) tablet 5 mg (5 mg Oral Given 9/6/21 1427)       Diagnostic Studies  Results Reviewed     Procedure Component Value Units Date/Time    POCT pregnancy, urine [[id number]]  (Normal) Resulted: 09/06/21 1427    Lab Status: Final result Updated: 09/06/21 1427     EXT PREG TEST UR (Ref: Negative) negative     Control valid                 No orders to display              Procedures  Procedures         ED Course                             SBIRT 22yo+      Most Recent Value   SBIRT (24 yo +)   In order to provide better care to our patients, we are screening all of our patients for alcohol and drug use  Would it be okay to ask you these screening questions? No Filed at: 09/06/2021 1409                    [facility]  Number of Diagnoses or Management Options  Sciatica  Diagnosis management comments: 41-year-old female presents with complaint pain symptoms consistent with right-sided sciatica  She has been taking over-the-counter medication along with a muscle relaxant with no improvement symptoms        Disposition  Final diagnoses:   Sciatica     Time reflects when diagnosis was documented in both MDM as applicable and the Disposition within this note     Time User Action Codes Description Comment    9/6/2021  2:14 PM Dennie Brenner Add [M54 30] Sciatica       ED Disposition     ED Disposition Condition Date/Time Comment    Discharge Stable Mon Sep 6, 2021  2:14 PM [facility] discharge to home/self care             Follow-up Information     Follow up With Specialties Details Why [address], [address] [place] [address],[address] [facility]  [phone]            Discharge Medication List as of 9/6/2021  2:16 PM      START taking these medications    Details   cyclobenzaprine (FLEXERIL) 10 mg tablet Take 1 tablet (10 mg total) by mouth 2 (two) times a day as needed for muscle spasms, Starting Mon 9/6/2021, Print      ketorolac (TORADOL) 10 mg tablet Take 1 tablet (10 mg total) by mouth every 6 (six) hours as needed for moderate pain, Starting Mon 9/6/2021, Print         CONTINUE these medications which have NOT CHANGED    Details   clonazePAM (KlonoPIN) 1 mg tablet Take 1 mg by mouth daily , Historical Med      ergocalciferol (VITAMIN D2) 50,000 units Take 1 capsule (50,000 Units total) by mouth once a week For 12 weeks then go to OTC Vit D 2,000 units daily, Starting Thu 8/26/2021, Normal      zolpidem (AMBIEN) 10 mg tablet Take 10 mg by mouth daily at bedtime, Starting Tue 1/19/2021, Historical Med               PDMP Review       Value Time User    PDMP Reviewed  Yes 3/25/2021  1:16 PM Chiara Dasilva MD          ED Provider  Electronically Signed by           Vinay Galvan DO  09/06/21 3643

## 2021-09-09 ENCOUNTER — TELEPHONE (OUTPATIENT)
Dept: HEMATOLOGY ONCOLOGY | Facility: CLINIC | Age: 36
End: 2021-09-09

## 2021-09-09 NOTE — TELEPHONE ENCOUNTER
Appointment Confirmation     Appointment with  Terra Sales   Appointment date & time 9-13-21 @ 11:30am   Location [place]    Patient verbilized Understanding

## 2021-09-13 ENCOUNTER — TELEPHONE (OUTPATIENT)
Dept: HEMATOLOGY ONCOLOGY | Facility: CLINIC | Age: 36
End: 2021-09-13

## 2021-09-13 ENCOUNTER — TELEPHONE (OUTPATIENT)
Dept: PHYSICAL THERAPY | Facility: OTHER | Age: 36
End: 2021-09-13

## 2021-09-13 NOTE — TELEPHONE ENCOUNTER
Appointment Cancellation Or Reschedule     Person calling in Patient    Provider Bebo List   Office Visit Date and Time 09/13   Office Visit Location [place]   Did patient want to reschedule their office appointment? If so, when was it scheduled to? Yes 11/01 at 1:30 pm    Reason for Cancellation or Reschedule Patient has family emergency and r/s appt      If the patient is a treatment patient, please route this to the office nurse  If the patient is not on treatment, please route to the office MA

## 2021-09-26 ENCOUNTER — HOSPITAL ENCOUNTER (EMERGENCY)
Facility: HOSPITAL | Age: 36
Discharge: HOME | End: 2021-09-26
Attending: EMERGENCY MEDICINE
Payer: COMMERCIAL

## 2021-09-26 VITALS
RESPIRATION RATE: 16 BRPM | BODY MASS INDEX: 35.32 KG/M2 | SYSTOLIC BLOOD PRESSURE: 103 MMHG | WEIGHT: 193.12 LBS | TEMPERATURE: 98 F | OXYGEN SATURATION: 100 % | DIASTOLIC BLOOD PRESSURE: 66 MMHG | HEART RATE: 84 BPM

## 2021-09-26 DIAGNOSIS — M54.30 SCIATICA: Primary | ICD-10-CM

## 2021-09-26 PROCEDURE — 99283 EMERGENCY DEPT VISIT LOW MDM: CPT

## 2021-09-26 PROCEDURE — 99284 EMERGENCY DEPT VISIT MOD MDM: CPT | Performed by: EMERGENCY MEDICINE

## 2021-09-26 PROCEDURE — 96372 THER/PROPH/DIAG INJ SC/IM: CPT

## 2021-09-26 RX ORDER — CYCLOBENZAPRINE HCL 10 MG
10 TABLET ORAL 2 TIMES DAILY PRN
Qty: 20 TABLET | Refills: 0 | Status: SHIPPED | OUTPATIENT
Start: 2021-09-26

## 2021-09-26 RX ORDER — LIDOCAINE 50 MG/G
1 PATCH TOPICAL ONCE
Status: DISCONTINUED | OUTPATIENT
Start: 2021-09-26 | End: 2021-09-26 | Stop reason: HOSPADM

## 2021-09-26 RX ORDER — KETOROLAC TROMETHAMINE 30 MG/ML
15 INJECTION, SOLUTION INTRAMUSCULAR; INTRAVENOUS ONCE
Status: COMPLETED | OUTPATIENT
Start: 2021-09-26 | End: 2021-09-26

## 2021-09-26 RX ORDER — DIAZEPAM 5 MG/1
5 TABLET ORAL ONCE
Status: COMPLETED | OUTPATIENT
Start: 2021-09-26 | End: 2021-09-26

## 2021-09-26 RX ORDER — KETOROLAC TROMETHAMINE 10 MG/1
10 TABLET, FILM COATED ORAL EVERY 6 HOURS PRN
Qty: 10 TABLET | Refills: 0 | Status: SHIPPED | OUTPATIENT
Start: 2021-09-26

## 2021-09-26 RX ADMIN — LIDOCAINE 1 PATCH: 50 PATCH TOPICAL at 12:36

## 2021-09-26 RX ADMIN — DIAZEPAM 5 MG: 5 TABLET ORAL at 12:36

## 2021-09-26 RX ADMIN — KETOROLAC TROMETHAMINE 15 MG: 30 INJECTION, SOLUTION INTRAMUSCULAR; INTRAVENOUS at 12:36

## 2021-10-31 ENCOUNTER — HOSPITAL ENCOUNTER (EMERGENCY)
Facility: HOSPITAL | Age: 36
Discharge: HOME | End: 2021-10-31
Attending: EMERGENCY MEDICINE | Admitting: EMERGENCY MEDICINE
Payer: COMMERCIAL

## 2021-10-31 VITALS
WEIGHT: 198 LBS | TEMPERATURE: 96.1 F | OXYGEN SATURATION: 100 % | DIASTOLIC BLOOD PRESSURE: 91 MMHG | SYSTOLIC BLOOD PRESSURE: 145 MMHG | RESPIRATION RATE: 16 BRPM | HEART RATE: 122 BPM | BODY MASS INDEX: 36.21 KG/M2

## 2021-10-31 DIAGNOSIS — M54.31 SCIATICA OF RIGHT SIDE: Primary | ICD-10-CM

## 2021-10-31 DIAGNOSIS — R00.0 TACHYCARDIA: ICD-10-CM

## 2021-10-31 LAB
ATRIAL RATE: 139 BPM
P AXIS: 63 DEGREES
PR INTERVAL: 122 MS
QRS AXIS: 8 DEGREES
QRSD INTERVAL: 68 MS
QT INTERVAL: 286 MS
QTC INTERVAL: 435 MS
T WAVE AXIS: 59 DEGREES
VENTRICULAR RATE: 139 BPM

## 2021-10-31 PROCEDURE — 93010 ELECTROCARDIOGRAM REPORT: CPT | Performed by: INTERNAL MEDICINE

## 2021-10-31 PROCEDURE — 93005 ELECTROCARDIOGRAM TRACING: CPT

## 2021-10-31 PROCEDURE — 99284 EMERGENCY DEPT VISIT MOD MDM: CPT

## 2021-10-31 PROCEDURE — 96372 THER/PROPH/DIAG INJ SC/IM: CPT

## 2021-10-31 PROCEDURE — 99284 EMERGENCY DEPT VISIT MOD MDM: CPT | Performed by: EMERGENCY MEDICINE

## 2021-10-31 RX ORDER — KETOROLAC TROMETHAMINE 30 MG/ML
30 INJECTION, SOLUTION INTRAMUSCULAR; INTRAVENOUS ONCE
Status: COMPLETED | OUTPATIENT
Start: 2021-10-31 | End: 2021-10-31

## 2021-10-31 RX ORDER — LIDOCAINE 50 MG/G
1 PATCH TOPICAL ONCE
Status: DISCONTINUED | OUTPATIENT
Start: 2021-10-31 | End: 2021-10-31 | Stop reason: HOSPADM

## 2021-10-31 RX ORDER — LIDOCAINE 50 MG/G
1 PATCH TOPICAL DAILY
Qty: 7 PATCH | Refills: 0 | Status: SHIPPED | OUTPATIENT
Start: 2021-10-31 | End: 2021-11-07

## 2021-10-31 RX ADMIN — KETOROLAC TROMETHAMINE 30 MG: 30 INJECTION, SOLUTION INTRAMUSCULAR; INTRAVENOUS at 18:02

## 2021-10-31 RX ADMIN — LIDOCAINE 1 PATCH: 50 PATCH TOPICAL at 18:03

## 2021-11-01 ENCOUNTER — OFFICE VISIT (OUTPATIENT)
Dept: HEMATOLOGY ONCOLOGY | Facility: CLINIC | Age: 36
End: 2021-11-01
Payer: COMMERCIAL

## 2021-11-01 VITALS
OXYGEN SATURATION: 96 % | WEIGHT: 196.4 LBS | DIASTOLIC BLOOD PRESSURE: 90 MMHG | HEART RATE: 100 BPM | RESPIRATION RATE: 18 BRPM | SYSTOLIC BLOOD PRESSURE: 126 MMHG | TEMPERATURE: 96 F | BODY MASS INDEX: 35.92 KG/M2

## 2021-11-01 DIAGNOSIS — E53.8 FOLIC ACID DEFICIENCY: ICD-10-CM

## 2021-11-01 DIAGNOSIS — D68.0 VON WILLEBRAND DISEASE (HCC): Primary | ICD-10-CM

## 2021-11-01 DIAGNOSIS — E53.8 B12 DEFICIENCY: ICD-10-CM

## 2021-11-01 PROCEDURE — 99214 OFFICE O/P EST MOD 30 MIN: CPT | Performed by: NURSE PRACTITIONER

## 2021-11-01 RX ORDER — FOLIC ACID 1 MG/1
1 TABLET ORAL DAILY
Qty: 90 TABLET | Refills: 4 | Status: SHIPPED | OUTPATIENT
Start: 2021-11-01

## 2021-11-04 ENCOUNTER — TELEPHONE (OUTPATIENT)
Dept: PHYSICAL THERAPY | Facility: OTHER | Age: 36
End: 2021-11-04

## 2021-11-23 ENCOUNTER — TELEPHONE (OUTPATIENT)
Dept: PSYCHIATRY | Facility: CLINIC | Age: 36
End: 2021-11-23

## 2021-12-17 ENCOUNTER — HOSPITAL ENCOUNTER (EMERGENCY)
Facility: HOSPITAL | Age: 36
Discharge: HOME | End: 2021-12-17
Attending: EMERGENCY MEDICINE
Payer: COMMERCIAL

## 2021-12-17 VITALS
BODY MASS INDEX: 36.01 KG/M2 | DIASTOLIC BLOOD PRESSURE: 81 MMHG | SYSTOLIC BLOOD PRESSURE: 130 MMHG | WEIGHT: 196.87 LBS | TEMPERATURE: 97.9 F | RESPIRATION RATE: 16 BRPM | HEART RATE: 130 BPM | OXYGEN SATURATION: 97 %

## 2021-12-17 DIAGNOSIS — M54.30 SCIATICA: Primary | ICD-10-CM

## 2021-12-17 PROCEDURE — 99283 EMERGENCY DEPT VISIT LOW MDM: CPT | Performed by: EMERGENCY MEDICINE

## 2021-12-17 PROCEDURE — 99283 EMERGENCY DEPT VISIT LOW MDM: CPT

## 2021-12-17 PROCEDURE — 96372 THER/PROPH/DIAG INJ SC/IM: CPT

## 2021-12-17 RX ORDER — KETOROLAC TROMETHAMINE 30 MG/ML
15 INJECTION, SOLUTION INTRAMUSCULAR; INTRAVENOUS ONCE
Status: COMPLETED | OUTPATIENT
Start: 2021-12-17 | End: 2021-12-17

## 2021-12-17 RX ORDER — LIDOCAINE 50 MG/G
1 PATCH TOPICAL ONCE
Status: DISCONTINUED | OUTPATIENT
Start: 2021-12-17 | End: 2021-12-17 | Stop reason: HOSPADM

## 2021-12-17 RX ADMIN — LIDOCAINE 1 PATCH: 50 PATCH TOPICAL at 18:28

## 2021-12-17 RX ADMIN — KETOROLAC TROMETHAMINE 15 MG: 30 INJECTION, SOLUTION INTRAMUSCULAR; INTRAVENOUS at 18:28

## 2022-03-31 ENCOUNTER — HOSPITAL ENCOUNTER (EMERGENCY)
Facility: HOSPITAL | Age: 37
Discharge: HOME | End: 2022-03-31
Attending: EMERGENCY MEDICINE | Admitting: EMERGENCY MEDICINE
Payer: COMMERCIAL

## 2022-03-31 VITALS
OXYGEN SATURATION: 98 % | TEMPERATURE: 96.9 F | RESPIRATION RATE: 18 BRPM | WEIGHT: 201.7 LBS | HEART RATE: 80 BPM | DIASTOLIC BLOOD PRESSURE: 66 MMHG | BODY MASS INDEX: 36.89 KG/M2 | SYSTOLIC BLOOD PRESSURE: 109 MMHG

## 2022-03-31 DIAGNOSIS — K04.7 DENTAL INFECTION: Primary | ICD-10-CM

## 2022-03-31 PROCEDURE — 99282 EMERGENCY DEPT VISIT SF MDM: CPT

## 2022-03-31 PROCEDURE — 99284 EMERGENCY DEPT VISIT MOD MDM: CPT | Performed by: PHYSICIAN ASSISTANT

## 2022-03-31 RX ORDER — AMOXICILLIN 500 MG/1
500 CAPSULE ORAL EVERY 12 HOURS SCHEDULED
Qty: 20 CAPSULE | Refills: 0 | Status: SHIPPED | OUTPATIENT
Start: 2022-03-31 | End: 2022-04-10

## 2022-03-31 RX ORDER — NAPROXEN 500 MG/1
500 TABLET ORAL 2 TIMES DAILY PRN
Qty: 20 TABLET | Refills: 0 | Status: SHIPPED | OUTPATIENT
Start: 2022-03-31 | End: 2023-03-31

## 2022-03-31 NOTE — Clinical Note
Trini Carver was seen and treated in our emergency department on 3/31/2022  Diagnosis:     Seble Rogers  may return to work on return date  She may return on this date: 04/02/2022         If you have any questions or concerns, please don't hesitate to call        Asher Murray PA-C    ______________________________           _______________          _______________  Hospital Representative                              Date                                Time

## 2022-03-31 NOTE — ED PROVIDER NOTES
History  Chief Complaint   Patient presents with    Dental Pain     right upper molar pain x2 days with facial swelling     Patient is a 78-year-old female presents today for evaluation of right-sided upper jaw dental pain ongoing for the past 2 days  Patient reports he ate a [place] ranc[employer] 2 days ago which broke her tooth  Patient states since then she has been having facial pain and swelling reports significant pain associated with eating or chewing  Patient reports no alleviating factors  Patient states the pain was sudden in onset and constant in nature aching and throbbing radiating throughout her jaw  Patient reports she has a dentist however has been unable to get an appointment with none  Patient denies any fevers or chills or difficulty managing oral secretions or swelling  Patient denies any lip or tongue swelling  Prior to Admission Medications   Prescriptions Last Dose Informant Patient Reported? Taking?    clonazePAM (KlonoPIN) 1 mg tablet  Self Yes No   Sig: Take 1 mg by mouth daily    Patient not taking: Reported on 11/1/2021   cyanocobalamin (VITAMIN B-12) 1000 MCG tablet   No No   Sig: Take 1 tablet (1,000 mcg total) by mouth daily   cyclobenzaprine (FLEXERIL) 10 mg tablet  Self No No   Sig: Take 1 tablet (10 mg total) by mouth 2 (two) times a day as needed for muscle spasms   ergocalciferol (VITAMIN D2) 50,000 units  Self No No   Sig: Take 1 capsule (50,000 Units total) by mouth once a week For 12 weeks then go to OTC Vit D 2,000 units daily   folic acid (FOLVITE) 1 mg tablet   No No   Sig: Take 1 tablet (1 mg total) by mouth daily   ketorolac (TORADOL) 10 mg tablet  Self No No   Sig: Take 1 tablet (10 mg total) by mouth every 6 (six) hours as needed for moderate pain   lidocaine (Lidoderm) 5 %  Self No No   Sig: Apply 1 patch topically daily for 7 days Remove & Discard patch within 12 hours or as directed by MD   zolpidem (AMBIEN) 10 mg tablet  Self Yes No   Sig: Take 10 mg by mouth daily at bedtime   Patient not taking: Reported on 11/1/2021      Facility-Administered Medications: None       Past Medical History:   Diagnosis Date    Anxiety     Asthma     Intravenous drug abuse ([facility] )     Von Willebrand disease ([facility] )        Past Surgical History:   Procedure Laterality Date    APPENDECTOMY      CHOLECYSTECTOMY      PARTIAL HYSTERECTOMY         History reviewed  No pertinent family history  I have reviewed and agree with the history as documented  E-Cigarette/Vaping    E-Cigarette Use Never User      E-Cigarette/Vaping Substances     Social History     Tobacco Use    Smoking status: Current Some Day Smoker     Packs/day: 0 50     Types: Cigarettes    Smokeless tobacco: Never Used    Tobacco comment: occasional   Vaping Use    Vaping Use: Never used   Substance Use Topics    Alcohol use: Never    Drug use: Yes     Types: Heroin     Comment: IV        Review of Systems   Constitutional: Negative for chills, fatigue and fever  HENT: Positive for dental problem and facial swelling  Negative for congestion, ear pain, rhinorrhea and sore throat  Eyes: Negative for redness  Respiratory: Negative for chest tightness and shortness of breath  Cardiovascular: Negative for chest pain and palpitations  Gastrointestinal: Negative for abdominal pain, nausea and vomiting  Genitourinary: Negative for dysuria and hematuria  Musculoskeletal: Negative  Skin: Negative for rash  Neurological: Negative for dizziness, syncope, light-headedness and numbness  Physical Exam  Physical Exam  Vitals and nursing note reviewed  Constitutional:       Appearance: She is well-developed  HENT:      Head: Normocephalic  Mouth/Throat:     Eyes:      General: No scleral icterus  Cardiovascular:      Rate and Rhythm: Normal rate and regular rhythm  Pulmonary:      Effort: Pulmonary effort is normal       Breath sounds: Normal breath sounds  No stridor     Abdominal: General: There is no distension  Palpations: Abdomen is soft  Tenderness: There is no abdominal tenderness  Musculoskeletal:         General: Normal range of motion  Skin:     General: Skin is warm and dry  Capillary Refill: Capillary refill takes less than 2 seconds  Neurological:      Mental Status: She is alert and oriented to person, place, and time  Vital Signs  ED Triage Vitals [03/31/22 0857]   Temperature Pulse Respirations Blood Pressure SpO2   (!) 96 9 °F (36 1 °C) 80 18 109/66 98 %      Temp Source Heart Rate Source Patient Position - Orthostatic VS BP Location FiO2 (%)   Oral Monitor Sitting Left arm --      Pain Score       8           Vitals:    03/31/22 0857   BP: 109/66   Pulse: 80   Patient Position - Orthostatic VS: Sitting         Visual Acuity      ED Medications  Medications - No data to display    Diagnostic Studies  Results Reviewed     None                 No orders to display              Procedures  Procedures         ED Course                               SBIRT 22yo+      Most Recent Value   SBIRT (22 yo +)    In order to provide better care to our patients, we are screening all of our patients for alcohol and drug use  Would it be okay to ask you these screening questions? No Filed at: 03/31/2022 0900                    MDM  Number of Diagnoses or Management Options  Dental infection  Diagnosis management comments: Strict return to ED precautions discussed  Patient recommended to follow up promptly with appropriate outpatient provider  Patient and/or family members verbalizes understanding and agrees with plan  Patient is stable for discharge      Portions of the record may have been created with voice recognition software  Occasional wrong word or "sound a like" substitutions may have occurred due to the inherent limitations of voice recognition software   Read the chart carefully and recognize, using context, where substitutions have occurred  Disposition  Final diagnoses:   Dental infection     Time reflects when diagnosis was documented in both MDM as applicable and the Disposition within this note     Time User Action Codes Description Comment    3/31/2022  9:15 AM Sil Villareal Add [K04 7] Dental infection       ED Disposition     ED Disposition Condition Date/Time Comment    Discharge Stable Thu Mar 31, 2022  9:15 AM Rhiannon Alcantara discharge to home/self care  Follow-up Information     Follow up With Specialties Details Why [address]  Schedule an appointment as soon as possible for a visit in 2 days  [address]  [facility]  Oral Surgery Schedule an appointment as soon as possible for a visit in 2 days  [address] [facility]            Patient's Medications   Discharge Prescriptions    AMOXICILLIN (AMOXIL) 500 MG CAPSULE    Take 1 capsule (500 mg total) by mouth every 12 (twelve) hours for 10 days       Start Date: 3/31/2022 End Date: 4/10/2022       Order Dose: 500 mg       Quantity: 20 capsule    Refills: 0    NAPROXEN (EC NAPROSYN) 500 MG EC TABLET    Take 1 tablet (500 mg total) by mouth 2 (two) times a day as needed for mild pain (TAKE WITH FOOD - FOR PAIN)       Start Date: 3/31/2022 End Date: 3/31/2023       Order Dose: 500 mg       Quantity: 20 tablet    Refills: 0       No discharge procedures on file      PDMP Review       Value Time User    PDMP Reviewed  Yes 3/25/2021  1:16 PM Johan Smith MD          ED Provider  Electronically Signed by           Annia Thacker PA-C  03/31/22 8228

## 2022-04-01 ENCOUNTER — TELEPHONE (OUTPATIENT)
Dept: HEMATOLOGY ONCOLOGY | Facility: CLINIC | Age: 37
End: 2022-04-01

## 2022-04-01 NOTE — TELEPHONE ENCOUNTER
Appointment Confirmation (to confirm pre existing appointments - ONLY)     Appointment with  Dr Shoshana Meehan   Appointment date & time  5/9/22 1:00 pm   Location [place]   Patient verbilized Understanding  yes

## 2022-04-04 ENCOUNTER — OFFICE VISIT (OUTPATIENT)
Dept: DENTISTRY | Facility: CLINIC | Age: 37
End: 2022-04-04

## 2022-04-04 VITALS — HEART RATE: 86 BPM | DIASTOLIC BLOOD PRESSURE: 68 MMHG | SYSTOLIC BLOOD PRESSURE: 95 MMHG | TEMPERATURE: 97 F

## 2022-04-04 DIAGNOSIS — Z01.20 ENCOUNTER FOR DENTAL EXAMINATION: Primary | ICD-10-CM

## 2022-04-04 PROCEDURE — D0220 INTRAORAL - PERIAPICAL FIRST RADIOGRAPHIC IMAGE: HCPCS | Performed by: DENTAL HYGIENIST

## 2022-04-04 PROCEDURE — D0140 LIMITED ORAL EVALUATION - PROBLEM FOCUSED: HCPCS | Performed by: DENTIST

## 2022-04-04 RX ORDER — ALBUTEROL SULFATE 90 UG/1
AEROSOL, METERED RESPIRATORY (INHALATION)
COMMUNITY
Start: 2021-12-28

## 2022-04-04 NOTE — PROGRESS NOTES
Dental procedures in this visit     - LIMITED ORAL EVALUATION - PROBLEM FOCUSED (Completed)     Service provider: Josh Hurst     Billing provider: Willaim Osler, DMD     - INTRAORAL - PERIAPICAL FIRST RADIOGRAPHIC IMAGE 5 (Completed)     Service provider: Joel Kearns [address]     Billing provider: Willaim Osler, DMD     Subjective   Patient ID: Jimenez Andujar is a 42 y o  female  Chief Complaint   Patient presents with   Kerry Hooker New Patient Visit    Emergency/limited Exam     ASA II, Reviewed M/DH  Pt has Asthma  Was seen in the ER on 3/31/22 for UR dental pain  She was given Amoxicillin - 500 mg - every 12 hrs for 10 days  Also Naproxen for pain  Today's treatment - Limited Exam, 1 PA #5  Radiographically - #5 - Large decay - unrestorable - recommend extraction      Dr Amisha Curiel completed today's exam      NV1:  Ext #5 - 60 min  NV2:  CompEX, FMX, FMP - 60 min

## 2022-05-04 ENCOUNTER — TELEPHONE (OUTPATIENT)
Dept: HEMATOLOGY ONCOLOGY | Facility: CLINIC | Age: 37
End: 2022-05-04

## 2022-05-04 NOTE — TELEPHONE ENCOUNTER
Appointment Cancellation Or Reschedule     Person calling in Patient    Provider Dr Joselin Vital   Office Visit Date and Time 5/9/22 at 1   Office Visit Location [place]   Did patient want to reschedule their office appointment? If so, when was it scheduled to? 6/20/22 at 2:40    Is this patient calling to reschedule an infusion appointment? No   When is their next infusion appointment? NA   Is this patient a Chemo patient? No   Reason for Cancellation or Reschedule Schedule Conflict      If the patient is a treatment patient, please route this to the office nurse  If the patient is not on treatment, please route to the office MA

## 2022-05-04 NOTE — TELEPHONE ENCOUNTER
LVM to the patient in regards to her lab work that needs to be completed prior to her appointment on 5/9/22 at 1:00pm

## 2022-06-20 ENCOUNTER — TELEPHONE (OUTPATIENT)
Dept: HEMATOLOGY ONCOLOGY | Facility: CLINIC | Age: 37
End: 2022-06-20

## 2022-06-24 ENCOUNTER — TELEPHONE (OUTPATIENT)
Dept: HEMATOLOGY ONCOLOGY | Facility: CLINIC | Age: 37
End: 2022-06-24

## 2022-06-24 NOTE — TELEPHONE ENCOUNTER
Scheduling Appointment     Who Is Calling to Schedule Patient   Doctor Dr [place]   Date and Time 8/22 at 2pm    Reason for scheduling appointment 6 month f/u labs;   NO DT NEEDED   Patient verbalized understanding    Yes

## 2022-06-30 PROBLEM — R79.89 ELEVATED LFTS: Status: RESOLVED | Noted: 2021-04-20 | Resolved: 2022-06-30

## 2022-08-17 ENCOUNTER — TELEPHONE (OUTPATIENT)
Dept: HEMATOLOGY ONCOLOGY | Facility: CLINIC | Age: 37
End: 2022-08-17

## 2022-08-17 NOTE — TELEPHONE ENCOUNTER
LVM to the patient in regards to her lab work that needs to be completed prior to her appt on 8/22/22 at 2:00pm

## 2022-08-22 ENCOUNTER — TELEPHONE (OUTPATIENT)
Dept: HEMATOLOGY ONCOLOGY | Facility: CLINIC | Age: 37
End: 2022-08-22

## 2022-08-22 NOTE — TELEPHONE ENCOUNTER
Left message to see if she  Went to another lab to have  Her blood work if not  I stated  That we will have to rs her appt  And to call [phone]

## 2023-02-06 ENCOUNTER — APPOINTMENT (EMERGENCY)
Dept: RADIOLOGY | Facility: HOSPITAL | Age: 38
End: 2023-02-06

## 2023-02-06 ENCOUNTER — HOSPITAL ENCOUNTER (EMERGENCY)
Facility: HOSPITAL | Age: 38
Discharge: HOME | End: 2023-02-06
Attending: EMERGENCY MEDICINE

## 2023-02-06 VITALS
HEART RATE: 90 BPM | OXYGEN SATURATION: 97 % | DIASTOLIC BLOOD PRESSURE: 66 MMHG | RESPIRATION RATE: 16 BRPM | WEIGHT: 217.15 LBS | SYSTOLIC BLOOD PRESSURE: 103 MMHG | TEMPERATURE: 98.4 F | BODY MASS INDEX: 39.72 KG/M2

## 2023-02-06 DIAGNOSIS — R07.81 RIB PAIN ON LEFT SIDE: ICD-10-CM

## 2023-02-06 DIAGNOSIS — H10.9 CONJUNCTIVITIS, LEFT EYE: Primary | ICD-10-CM

## 2023-02-06 LAB
ATRIAL RATE: 95 BPM
P AXIS: 54 DEGREES
PR INTERVAL: 142 MS
QRS AXIS: -6 DEGREES
QRSD INTERVAL: 78 MS
QT INTERVAL: 356 MS
QTC INTERVAL: 447 MS
T WAVE AXIS: 18 DEGREES
VENTRICULAR RATE: 95 BPM

## 2023-02-06 RX ORDER — TOBRAMYCIN AND DEXAMETHASONE 3; 1 MG/ML; MG/ML
1 SUSPENSION/ DROPS OPHTHALMIC
Qty: 2.5 ML | Refills: 0 | Status: SHIPPED | OUTPATIENT
Start: 2023-02-06 | End: 2023-02-13

## 2023-02-06 RX ORDER — ACETAMINOPHEN 325 MG/1
650 TABLET ORAL ONCE
Status: COMPLETED | OUTPATIENT
Start: 2023-02-06 | End: 2023-02-06

## 2023-02-06 RX ADMIN — ACETAMINOPHEN 650 MG: 325 TABLET ORAL at 15:16

## 2023-02-06 NOTE — DISCHARGE INSTRUCTIONS
1 - I have prescribed you an antibiotic for your eye  Apply one drop to your left eye every 4 hours (while you are awake, no need to wake up at night to apply)  Use for 7 days  If continue to experience symptoms, go to your PCP or urgent care for further evaluation  2 - Take Tylenol as needed for pain relief of left rib  3 - Return to the ED for severe chest pain, shortness of breath, difficulty breathing, excessive sweating develop

## 2023-02-06 NOTE — ED PROVIDER NOTES
History  Chief Complaint   Patient presents with   • Eye Redness     Left eye redness for 2 weeks   • Rib Pain     Left sided rib pain for 1 week     Duyen Medel is a 42year old female with a past medical history of Von Willebrand disease and IV drug use presenting for left eye redness x 2 weeks and left rib pain x 1 week  The eye complaint is accompanied by occasional blurry vision but denies pruritis, pain, discharge, contact use, photophobia, foreign body, vaginal discharge  She does not use contacts  Tried lubricating eye drops without relief  Nothing makes worse or better, has been constant since onset  The rib pain began one week ago, wasn't doing anything at time of onset  This is a transient sharp pain without radiation rated an 8/10 and is worsening over the past week  No prior or recent trauma  No chance of pregnancy  Denies fevers, chest pain, shortness of breath, difficulty breathing, cough, diaphoresis, palpitations  Is a nasal heroin user, last use this morning at 0900  Does not typically use cocaine but reports snorting a small amount of cocaine last week  Prior to Admission Medications   Prescriptions Last Dose Informant Patient Reported? Taking? albuterol (PROVENTIL HFA,VENTOLIN HFA) 90 mcg/act inhaler   Yes No   Sig: INHALE 2 PUFFS EVERY 4 HOURS BY INHALATION ROUTE AS NEEDED     clonazePAM (KlonoPIN) 1 mg tablet   Yes No   Sig: Take 1 mg by mouth daily     cyanocobalamin (VITAMIN B-12) 1000 MCG tablet   No No   Sig: Take 1 tablet (1,000 mcg total) by mouth daily   Patient not taking: Reported on 4/4/2022    cyclobenzaprine (FLEXERIL) 10 mg tablet   No No   Sig: Take 1 tablet (10 mg total) by mouth 2 (two) times a day as needed for muscle spasms   Patient not taking: Reported on 4/4/2022    ergocalciferol (VITAMIN D2) 50,000 units   No No   Sig: Take 1 capsule (50,000 Units total) by mouth once a week For 12 weeks then go to OTC Vit D 2,000 units daily   Patient not taking: Reported on 4/4/2022 folic acid (FOLVITE) 1 mg tablet   No No   Sig: Take 1 tablet (1 mg total) by mouth daily   Patient not taking: Reported on 4/4/2022    ketorolac (TORADOL) 10 mg tablet   No No   Sig: Take 1 tablet (10 mg total) by mouth every 6 (six) hours as needed for moderate pain   Patient not taking: Reported on 4/4/2022    lidocaine (Lidoderm) 5 %   No No   Sig: Apply 1 patch topically daily for 7 days Remove & Discard patch within 12 hours or as directed by MD   naproxen (EC NAPROSYN) 500 MG EC tablet   No No   Sig: Take 1 tablet (500 mg total) by mouth 2 (two) times a day as needed for mild pain (TAKE WITH FOOD - FOR PAIN)   zolpidem (AMBIEN) 10 mg tablet   Yes No   Sig: Take 10 mg by mouth daily at bedtime   Patient not taking: Reported on 11/1/2021      Facility-Administered Medications: None       Past Medical History:   Diagnosis Date   • Anxiety    • Asthma    • Elevated LFTs 4/20/2021    Back to normal levels in July 2021  • Intravenous drug abuse ([facility] [address] )    • Von Willebrand disease        Past Surgical History:   Procedure Laterality Date   • APPENDECTOMY     • CHOLECYSTECTOMY     • PARTIAL HYSTERECTOMY         History reviewed  No pertinent family history  I have reviewed and agree with the history as documented  E-Cigarette/Vaping   • E-Cigarette Use Never User      E-Cigarette/Vaping Substances     Social History     Tobacco Use   • Smoking status: Some Days     Packs/day: 0 50     Types: Cigarettes   • Smokeless tobacco: Never   • Tobacco comments:     occasional   Vaping Use   • Vaping Use: Never used   Substance Use Topics   • Alcohol use: Never   • Drug use: Not Currently     Types: Heroin     Comment: former       Review of Systems   Constitutional: Negative for chills, diaphoresis, fatigue and fever  HENT: Negative for congestion, ear pain, rhinorrhea and sore throat  Eyes: Positive for redness and visual disturbance (blurry vision occasionally)   Negative for photophobia, pain, discharge and itching  Respiratory: Negative for cough, chest tightness and shortness of breath  Cardiovascular: Positive for palpitations  Negative for chest pain and leg swelling  Rib pain on left side   Gastrointestinal: Negative for abdominal pain, constipation, diarrhea, nausea and vomiting  Genitourinary: Negative for dysuria, flank pain, frequency, urgency and vaginal discharge  Musculoskeletal: Negative for back pain, myalgias, neck pain and neck stiffness  Skin: Negative for pallor and rash  Neurological: Negative for light-headedness and headaches  Hematological: Negative for adenopathy  Psychiatric/Behavioral: Negative for self-injury and suicidal ideas  The patient is not nervous/anxious  Physical Exam  Physical Exam  Vitals reviewed  Constitutional:       General: She is not in acute distress  Appearance: Normal appearance  She is not ill-appearing, toxic-appearing or diaphoretic  HENT:      Head: Normocephalic and atraumatic  Nose: Nose normal       Mouth/Throat:      Mouth: Mucous membranes are moist       Pharynx: Oropharynx is clear  No oropharyngeal exudate or posterior oropharyngeal erythema  Eyes:      General:         Right eye: No discharge  Left eye: No discharge  Extraocular Movements: Extraocular movements intact  Conjunctiva/sclera:      Right eye: No hemorrhage  Left eye: No hemorrhage  Pupils: Pupils are equal, round, and reactive to light  Cardiovascular:      Rate and Rhythm: Normal rate and regular rhythm  Pulses: Normal pulses  Heart sounds: Normal heart sounds  Pulmonary:      Effort: Pulmonary effort is normal       Breath sounds: Examination of the right-upper field reveals rhonchi  Examination of the right-middle field reveals rhonchi  Examination of the left-middle field reveals rhonchi  Examination of the right-lower field reveals rhonchi  Rhonchi present  Comments: Rhonchi clears with cough   No signs of respiratory distress  Chest:      Chest wall: No tenderness  Abdominal:      General: Bowel sounds are normal       Palpations: Abdomen is soft  Tenderness: There is abdominal tenderness in the left upper quadrant  There is no right CVA tenderness, left CVA tenderness or guarding  Musculoskeletal:      Cervical back: Normal range of motion and neck supple  No rigidity or tenderness  Lymphadenopathy:      Cervical: No cervical adenopathy  Skin:     General: Skin is warm and dry  Capillary Refill: Capillary refill takes less than 2 seconds  Neurological:      General: No focal deficit present  Mental Status: She is alert and oriented to person, place, and time  Psychiatric:         Mood and Affect: Mood normal          Behavior: Behavior normal          Vital Signs  ED Triage Vitals   Temperature Pulse Respirations Blood Pressure SpO2   02/06/23 1316 02/06/23 1316 02/06/23 1316 02/06/23 1316 02/06/23 1316   (!) 97 1 °F (36 2 °C) (!) 109 12 (!) 149/109 98 %      Temp Source Heart Rate Source Patient Position - Orthostatic VS BP Location FiO2 (%)   02/06/23 1316 02/06/23 1316 02/06/23 1316 02/06/23 1316 --   Tympanic Monitor Sitting Left arm       Pain Score       02/06/23 1454       8           Vitals:    02/06/23 1316 02/06/23 1454   BP: (!) 149/109 103/66   Pulse: (!) 109 90   Patient Position - Orthostatic VS: Sitting          Visual Acuity      ED Medications  Medications   acetaminophen (TYLENOL) tablet 650 mg (650 mg Oral Given 2/6/23 1516)       Diagnostic Studies  Results Reviewed     None                 XR chest 2 views   ED Interpretation by Eldon Stapleton PA-C (02/06 1514)   No acute pathology  Final Result by Serene Ryan MD (02/06 1438)      No acute cardiopulmonary disease  Workstation performed: [id number]                    Procedures  ECG 12 Lead Documentation Only    Date/Time: 2/6/2023 4:05 PM  Performed by:  China Anthony FREDIS Mascorro  Authorized by: Lyubov Latif PA-C     ECG reviewed by me, the ED Provider: yes    Patient location:  ED  Previous ECG:     Previous ECG:  Compared to current    Comparison to cardiac monitor: No    Rate:     ECG rate assessment: tachycardic    Rhythm:     Rhythm: sinus rhythm    Ectopy:     Ectopy: none    QRS:     QRS axis:  Normal  Comments:      Reviewed EKG with Dr Rashmi Galvan  ED Course                                             Medical Decision Making  42year old female with past medical history of Erika Dave Disease presenting with left eye redness and left lower rib pain  No red flag signs for ACS including chest pain, pain radiating into left arm, shortness of breath, difficulty breathing, flu symptoms accompanying, diaphoresis, cocaine use today or yesterday  High blood pressure was noted on initial vitals, repeat shows normal BP of 103/66  Eye redness has been occurring for 2 weeks without relief with Visine  Prescribed tobramycin for further treatment  Chest x ray for further evaluation of rhonchi and rib pain which resulted with no acute rhonchi  Rib pain is reproducible with palpation, suspecting muscular strain  Recommended patient to take Tylenol as needed for pain relief  Recommended establishing care with a PCP for follow up evaluations and overall health maintenance  Strict instructions provided as to when to return to the ED to include but not limited to: fevers, severe chest pain, palpitations, difficulty breathing, shortness of breath, diaphoresis, worsening pain, pain that radiates into left arm, lightheadedness, changes in vision, syncope  Conjunctivitis, left eye: acute illness or injury  Rib pain on left side: acute illness or injury  Amount and/or Complexity of Data Reviewed  Radiology: ordered and independent interpretation performed  ECG/medicine tests: ordered and independent interpretation performed    Discussion of management or test interpretation with external provider(s): Discussed case management and test interpretation with Dr Brittany Sherman  Risk  OTC drugs  Prescription drug management  Disposition  Final diagnoses:   Conjunctivitis, left eye   Rib pain on left side     Time reflects when diagnosis was documented in both MDM as applicable and the Disposition within this note     Time User Action Codes Description Comment    2/6/2023  3:15 PM Parth Porras [H10 9] Conjunctivitis, left eye     2/6/2023  3:15 PM Oumarinoblaine Mendezsarah Add [R07 81] Rib pain on left side       ED Disposition     ED Disposition   Discharge    Condition   Stable    Date/Time   Mon Feb 6, 2023  3:15 PM    Comment   Mireille Course discharge to home/self care                 Follow-up Information    None         Discharge Medication List as of 2/6/2023  3:27 PM      START taking these medications    Details   tobramycin-dexamethasone (TOBRADEX) ophthalmic suspension Administer 1 drop into the left eye every 4 (four) hours while awake for 7 days, Starting Mon 2/6/2023, Until Mon 2/13/2023, Normal         CONTINUE these medications which have NOT CHANGED    Details   albuterol (PROVENTIL HFA,VENTOLIN HFA) 90 mcg/act inhaler INHALE 2 PUFFS EVERY 4 HOURS BY INHALATION ROUTE AS NEEDED , Historical Med      clonazePAM (KlonoPIN) 1 mg tablet Take 1 mg by mouth daily  , Historical Med      cyanocobalamin (VITAMIN B-12) 1000 MCG tablet Take 1 tablet (1,000 mcg total) by mouth daily, Starting Mon 11/1/2021, Normal      cyclobenzaprine (FLEXERIL) 10 mg tablet Take 1 tablet (10 mg total) by mouth 2 (two) times a day as needed for muscle spasms, Starting Sun 9/26/2021, Print      ergocalciferol (VITAMIN D2) 50,000 units Take 1 capsule (50,000 Units total) by mouth once a week For 12 weeks then go to OTC Vit D 2,000 units daily, Starting Thu 4/73/6974, Normal      folic acid (FOLVITE) 1 mg tablet Take 1 tablet (1 mg total) by mouth daily, Starting Mon 11/1/2021, Normal      ketorolac (TORADOL) 10 mg tablet Take 1 tablet (10 mg total) by mouth every 6 (six) hours as needed for moderate pain, Starting Sun 9/26/2021, Print      lidocaine (Lidoderm) 5 % Apply 1 patch topically daily for 7 days Remove & Discard patch within 12 hours or as directed by MD, Starting Sun 10/31/2021, Until Sun 11/7/2021, Normal      naproxen (EC NAPROSYN) 500 MG EC tablet Take 1 tablet (500 mg total) by mouth 2 (two) times a day as needed for mild pain (TAKE WITH FOOD - FOR PAIN), Starting Thu 3/31/2022, Until Fri 3/31/2023 at 2359, Normal      zolpidem (AMBIEN) 10 mg tablet Take 10 mg by mouth daily at bedtime, Starting Tue 1/19/2021, Historical Med             No discharge procedures on file      PDMP Review       Value Time User    PDMP Reviewed  Yes 3/25/2021  1:16 PM Yoana Nelson          ED Provider  Electronically Signed by           Irwin Hernandez PA-C  02/06/23 9794

## 2023-08-23 ENCOUNTER — TELEPHONE (OUTPATIENT)
Dept: FAMILY MEDICINE CLINIC | Facility: CLINIC | Age: 38
End: 2023-08-23

## 2023-08-23 NOTE — TELEPHONE ENCOUNTER
08/23/23    Pt left message requesting a call back. CLERICAL:  first attempt to contact patient. no answer    Left message. If pt contacts office, please assist Pt. Pt did not leave a reason on the message of why she needed a call back.

## 2023-08-31 NOTE — TELEPHONE ENCOUNTER
08/31/23     Pt left message requesting a call back 08/23/23      second & LAST attempt to contact patient. no answer    Left message. CLERICAL:  first attempt to contact patient. no answer     Left message. If pt contacts office, please assist Pt. Pt did not leave a reason on the message of why she needed a call back.        NOTE: LETTER TO [facility] & [facility] FR[facility]IS[facility] HAS BEEN SENT

## 2023-11-24 ENCOUNTER — HOSPITAL ENCOUNTER (EMERGENCY)
Facility: HOSPITAL | Age: 38
Discharge: HOME | End: 2023-11-24
Attending: EMERGENCY MEDICINE
Payer: MEDICARE

## 2023-11-24 VITALS
WEIGHT: 191.36 LBS | TEMPERATURE: 97.8 F | OXYGEN SATURATION: 99 % | DIASTOLIC BLOOD PRESSURE: 100 MMHG | BODY MASS INDEX: 35 KG/M2 | SYSTOLIC BLOOD PRESSURE: 159 MMHG | HEART RATE: 107 BPM | RESPIRATION RATE: 18 BRPM

## 2023-11-24 DIAGNOSIS — R35.1 NOCTURIA: Primary | ICD-10-CM

## 2023-11-24 LAB
BILIRUB UR QL STRIP: NEGATIVE
CLARITY UR: CLEAR
COLOR UR: NORMAL
GLUCOSE SERPL-MCNC: 120 MG/DL (ref 65–140)
GLUCOSE UR STRIP-MCNC: NEGATIVE MG/DL
HGB UR QL STRIP.AUTO: NEGATIVE
KETONES UR STRIP-MCNC: NEGATIVE MG/DL
LEUKOCYTE ESTERASE UR QL STRIP: NEGATIVE
NITRITE UR QL STRIP: NEGATIVE
PH UR STRIP.AUTO: 6 [PH]
PROT UR STRIP-MCNC: NEGATIVE MG/DL
SP GR UR STRIP.AUTO: 1.01 (ref 1–1.04)
UROBILINOGEN UA: NEGATIVE MG/DL

## 2023-11-24 PROCEDURE — 81003 URINALYSIS AUTO W/O SCOPE: CPT | Performed by: EMERGENCY MEDICINE

## 2023-11-24 PROCEDURE — 99284 EMERGENCY DEPT VISIT MOD MDM: CPT | Performed by: EMERGENCY MEDICINE

## 2023-11-24 PROCEDURE — 99283 EMERGENCY DEPT VISIT LOW MDM: CPT

## 2023-11-24 PROCEDURE — 82948 REAGENT STRIP/BLOOD GLUCOSE: CPT

## 2023-11-24 RX ORDER — CLONIDINE HYDROCHLORIDE 0.2 MG/1
0.2 TABLET ORAL
COMMUNITY

## 2023-11-24 NOTE — ED PROVIDER NOTES
History  Chief Complaint   Patient presents with    Possible UTI     Frequent urination     Patient is a 78-year-old female who presents with a 2-3 week h/o polyuria at night. No issues during the day. No dysuria, nv, abdominal or flank pain. No fever. No h/o dm. Stopped drinking luiqids at 8 with no change. Prior to Admission Medications   Prescriptions Last Dose Informant Patient Reported? Taking? albuterol (PROVENTIL HFA,VENTOLIN HFA) 90 mcg/act inhaler   Yes No   Sig: INHALE 2 PUFFS EVERY 4 HOURS BY INHALATION ROUTE AS NEEDED. cloNIDine (CATAPRES) 0.2 mg tablet   Yes Yes   Sig: Take 0.2 mg by mouth daily at bedtime   clonazePAM (KlonoPIN) 1 mg tablet  Self Yes Yes   Sig: Take 1 mg by mouth daily      Facility-Administered Medications: None       Past Medical History:   Diagnosis Date    Anxiety     Asthma     Elevated LFTs 4/20/2021    Back to normal levels in July 2021. Intravenous drug abuse ([facility])     Von Willebrand disease ([facility])        Past Surgical History:   Procedure Laterality Date    APPENDECTOMY      CHOLECYSTECTOMY      PARTIAL HYSTERECTOMY         History reviewed. No pertinent family history. I have reviewed and agree with the history as documented. E-Cigarette/Vaping    E-Cigarette Use Never User      E-Cigarette/Vaping Substances     Social History     Tobacco Use    Smoking status: Some Days     Packs/day: 0.50     Types: Cigarettes    Smokeless tobacco: Never    Tobacco comments:     occasional   Vaping Use    Vaping Use: Never used   Substance Use Topics    Alcohol use: Never    Drug use: Not Currently     Types: Heroin     Comment: former       Review of Systems   Constitutional: Negative. HENT: Negative. Eyes: Negative. Respiratory: Negative. Cardiovascular: Negative. Gastrointestinal: Negative. Endocrine: Negative. Genitourinary:  Positive for frequency.  Negative for difficulty urinating, urgency, vaginal bleeding, vaginal discharge and vaginal pain.   Musculoskeletal: Negative. Skin: Negative. Allergic/Immunologic: Negative. Neurological: Negative. Hematological: Negative. Psychiatric/Behavioral: Negative. All other systems reviewed and are negative. Physical Exam  Physical Exam  Vitals and nursing note reviewed. Constitutional:       Appearance: Normal appearance. She is obese. HENT:      Head: Normocephalic and atraumatic. Cardiovascular:      Rate and Rhythm: Normal rate and regular rhythm. Pulses: Normal pulses. Heart sounds: Normal heart sounds. Pulmonary:      Effort: Pulmonary effort is normal.      Breath sounds: Normal breath sounds. Abdominal:      General: Bowel sounds are normal. There is no distension. Tenderness: There is no abdominal tenderness. There is no right CVA tenderness, left CVA tenderness, guarding or rebound. Musculoskeletal:         General: Normal range of motion. Cervical back: Normal range of motion and neck supple. Skin:     General: Skin is warm and dry. Neurological:      General: No focal deficit present. Mental Status: She is alert and oriented to person, place, and time.    Psychiatric:         Mood and Affect: Mood normal.         Behavior: Behavior normal.         Vital Signs  ED Triage Vitals [11/24/23 1603]   Temperature Pulse Respirations Blood Pressure SpO2   97.8 °F (36.6 °C) (!) 107 18 159/100 99 %      Temp Source Heart Rate Source Patient Position - Orthostatic VS BP Location FiO2 (%)   Tympanic Monitor Sitting Left arm --      Pain Score       --           Vitals:    11/24/23 1603   BP: 159/100   Pulse: (!) 107   Patient Position - Orthostatic VS: Sitting         Visual Acuity      ED Medications  Medications - No data to display    Diagnostic Studies  Results Reviewed       Procedure Component Value Units Date/Time    UA w Reflex to Microscopic w Reflex to Culture [[id number]]  (Normal) Collected: 11/24/23 1831    Lab Status: Final result Specimen: Urine, Other Updated: 11/24/23 1844     Color, UA Straw     Clarity, UA Clear     Specific Gravity, UA 1.010     pH, UA 6.0     Leukocytes, UA Negative     Nitrite, UA Negative     Protein, UA Negative mg/dl      Glucose, UA Negative mg/dl      Ketones, UA Negative mg/dl      Bilirubin, UA Negative     Occult Blood, UA Negative     UROBILINOGEN UA Negative mg/dL     Fingerstick Glucose (POCT) [[id number]]  (Normal) Collected: 11/24/23 1615    Lab Status: Final result Updated: 11/24/23 1616     POC Glucose 120 mg/dl                    No orders to display              Procedures  Procedures         ED Course  ED Course as of 11/25/23 1625   Fri Nov 24, 2023   1631 POC Glucose: 120   1854 UA back. Negative. Accucheck 120. Will dc with urology info. Medical Decision Making  Problems Addressed:  Nocturia: acute illness or injury     Details: 2-3 weeks of symptoms. poct glucose w/o signs of DM, plus polyuria would be throughout the day as well.  ua neg for glucose or infection. follow upw tih urology. Amount and/or Complexity of Data Reviewed  Labs: ordered. Decision-making details documented in ED Course. Disposition  Final diagnoses:   Nocturia     Time reflects when diagnosis was documented in both MDM as applicable and the Disposition within this note       Time User Action Codes Description Comment    11/24/2023  6:55 PM Fadumo Rule [R35.1] Nocturia           ED Disposition       ED Disposition   Discharge    Condition   Stable    Date/Time   Fri Nov 24, 2023  6:55 PM    Comment   Sara Stephen discharge to home/self care.                    Follow-up Information       Follow up With Specialties Details Why Contact Info Additional [address] [facility] Urology [facility]   [facility]  [address] [address]  [address] [place] [address] For Urology [facility] Ariana Nathan [facility], [address], [address] [phone]            Discharge Medication List as of 11/24/2023  6:55 PM        CONTINUE these medications which have NOT CHANGED    Details   clonazePAM (KlonoPIN) 1 mg tablet Take 1 mg by mouth daily, Historical Med      cloNIDine (CATAPRES) 0.2 mg tablet Take 0.2 mg by mouth daily at bedtime, Historical Med      albuterol (PROVENTIL HFA,VENTOLIN HFA) 90 mcg/act inhaler INHALE 2 PUFFS EVERY 4 HOURS BY INHALATION ROUTE AS NEEDED., Historical Med             No discharge procedures on file.     PDMP Review         Value Time User    PDMP Reviewed  Yes 3/25/2021  1:16 PM Joana Deeds            ED Provider  Electronically Signed by             Mario Orantes MD  11/25/23 8455

## 2023-12-23 ENCOUNTER — HOSPITAL ENCOUNTER (EMERGENCY)
Facility: HOSPITAL | Age: 38
Discharge: HOME | End: 2023-12-23
Attending: EMERGENCY MEDICINE | Admitting: EMERGENCY MEDICINE
Payer: MEDICARE

## 2023-12-23 ENCOUNTER — APPOINTMENT (EMERGENCY)
Dept: RADIOLOGY | Facility: HOSPITAL | Age: 38
End: 2023-12-23
Payer: MEDICARE

## 2023-12-23 VITALS
HEART RATE: 82 BPM | SYSTOLIC BLOOD PRESSURE: 136 MMHG | OXYGEN SATURATION: 97 % | TEMPERATURE: 98.1 F | DIASTOLIC BLOOD PRESSURE: 85 MMHG | RESPIRATION RATE: 20 BRPM | BODY MASS INDEX: 37.79 KG/M2 | WEIGHT: 206.6 LBS

## 2023-12-23 DIAGNOSIS — K02.9 PAIN DUE TO DENTAL CARIES: ICD-10-CM

## 2023-12-23 DIAGNOSIS — R10.12 LEFT UPPER QUADRANT ABDOMINAL PAIN: Primary | ICD-10-CM

## 2023-12-23 LAB
ALBUMIN SERPL BCP-MCNC: 3.9 G/DL (ref 3.5–5)
ALP SERPL-CCNC: 63 U/L (ref 34–104)
ALT SERPL W P-5'-P-CCNC: 17 U/L (ref 7–52)
ANION GAP SERPL CALCULATED.3IONS-SCNC: 7 MMOL/L
AST SERPL W P-5'-P-CCNC: 38 U/L (ref 13–39)
ATRIAL RATE: 107 BPM
BACTERIA UR QL AUTO: ABNORMAL /HPF
BASOPHILS # BLD AUTO: 0.04 THOUSANDS/ÂΜL (ref 0–0.1)
BASOPHILS NFR BLD AUTO: 0 % (ref 0–1)
BILIRUB SERPL-MCNC: 0.5 MG/DL (ref 0.2–1)
BILIRUB UR QL STRIP: NEGATIVE
BUN SERPL-MCNC: 13 MG/DL (ref 5–25)
CALCIUM SERPL-MCNC: 9.5 MG/DL (ref 8.4–10.2)
CHLORIDE SERPL-SCNC: 103 MMOL/L (ref 96–108)
CLARITY UR: ABNORMAL
CO2 SERPL-SCNC: 28 MMOL/L (ref 21–32)
COLOR UR: YELLOW
CREAT SERPL-MCNC: 0.8 MG/DL (ref 0.6–1.3)
EOSINOPHIL # BLD AUTO: 0.3 THOUSAND/ÂΜL (ref 0–0.61)
EOSINOPHIL NFR BLD AUTO: 3 % (ref 0–6)
ERYTHROCYTE [DISTWIDTH] IN BLOOD BY AUTOMATED COUNT: 12.6 % (ref 11.6–15.1)
EXT PREGNANCY TEST URINE: NEGATIVE
EXT. CONTROL: NORMAL
GFR SERPL CREATININE-BSD FRML MDRD: 92 ML/MIN/1.73SQ M
GLUCOSE SERPL-MCNC: 115 MG/DL (ref 65–140)
GLUCOSE UR STRIP-MCNC: NEGATIVE MG/DL
HCT VFR BLD AUTO: 45.2 % (ref 34.8–46.1)
HGB BLD-MCNC: 14.7 G/DL (ref 11.5–15.4)
HGB UR QL STRIP.AUTO: 10
IMM GRANULOCYTES # BLD AUTO: 0.07 THOUSAND/UL (ref 0–0.2)
IMM GRANULOCYTES NFR BLD AUTO: 1 % (ref 0–2)
KETONES UR STRIP-MCNC: NEGATIVE MG/DL
LEUKOCYTE ESTERASE UR QL STRIP: 25
LIPASE SERPL-CCNC: 31 U/L (ref 11–82)
LYMPHOCYTES # BLD AUTO: 4.01 THOUSANDS/ÂΜL (ref 0.6–4.47)
LYMPHOCYTES NFR BLD AUTO: 36 % (ref 14–44)
MCH RBC QN AUTO: 28.3 PG (ref 26.8–34.3)
MCHC RBC AUTO-ENTMCNC: 32.5 G/DL (ref 31.4–37.4)
MCV RBC AUTO: 87 FL (ref 82–98)
MONOCYTES # BLD AUTO: 0.65 THOUSAND/ÂΜL (ref 0.17–1.22)
MONOCYTES NFR BLD AUTO: 6 % (ref 4–12)
NEUTROPHILS # BLD AUTO: 6.13 THOUSANDS/ÂΜL (ref 1.85–7.62)
NEUTS SEG NFR BLD AUTO: 54 % (ref 43–75)
NITRITE UR QL STRIP: NEGATIVE
NON-SQ EPI CELLS URNS QL MICRO: ABNORMAL /HPF
NRBC BLD AUTO-RTO: 0 /100 WBCS
P AXIS: 37 DEGREES
PH UR STRIP.AUTO: 5 [PH]
PLATELET # BLD AUTO: 276 THOUSANDS/UL (ref 149–390)
PMV BLD AUTO: 9.9 FL (ref 8.9–12.7)
POTASSIUM SERPL-SCNC: 5.4 MMOL/L (ref 3.5–5.3)
PR INTERVAL: 134 MS
PROT SERPL-MCNC: 7.2 G/DL (ref 6.4–8.4)
PROT UR STRIP-MCNC: ABNORMAL MG/DL
QRS AXIS: -16 DEGREES
QRSD INTERVAL: 82 MS
QT INTERVAL: 366 MS
QTC INTERVAL: 488 MS
RBC # BLD AUTO: 5.2 MILLION/UL (ref 3.81–5.12)
RBC #/AREA URNS AUTO: ABNORMAL /HPF
SODIUM SERPL-SCNC: 138 MMOL/L (ref 135–147)
SP GR UR STRIP.AUTO: 1.03 (ref 1–1.04)
T WAVE AXIS: 8 DEGREES
UROBILINOGEN UA: NEGATIVE MG/DL
VENTRICULAR RATE: 107 BPM
WBC # BLD AUTO: 11.2 THOUSAND/UL (ref 4.31–10.16)
WBC #/AREA URNS AUTO: ABNORMAL /HPF

## 2023-12-23 PROCEDURE — 85025 COMPLETE CBC W/AUTO DIFF WBC: CPT

## 2023-12-23 PROCEDURE — 83690 ASSAY OF LIPASE: CPT

## 2023-12-23 PROCEDURE — 80053 COMPREHEN METABOLIC PANEL: CPT

## 2023-12-23 PROCEDURE — 36415 COLL VENOUS BLD VENIPUNCTURE: CPT

## 2023-12-23 PROCEDURE — 99285 EMERGENCY DEPT VISIT HI MDM: CPT

## 2023-12-23 PROCEDURE — 71046 X-RAY EXAM CHEST 2 VIEWS: CPT

## 2023-12-23 PROCEDURE — 99284 EMERGENCY DEPT VISIT MOD MDM: CPT

## 2023-12-23 PROCEDURE — 81025 URINE PREGNANCY TEST: CPT

## 2023-12-23 PROCEDURE — 81001 URINALYSIS AUTO W/SCOPE: CPT

## 2023-12-23 PROCEDURE — 93005 ELECTROCARDIOGRAM TRACING: CPT

## 2023-12-23 PROCEDURE — 81003 URINALYSIS AUTO W/O SCOPE: CPT

## 2023-12-23 RX ORDER — CHLORHEXIDINE GLUCONATE ORAL RINSE 1.2 MG/ML
15 SOLUTION DENTAL 2 TIMES DAILY
Qty: 120 ML | Refills: 0 | Status: SHIPPED | OUTPATIENT
Start: 2023-12-23

## 2023-12-23 RX ORDER — ACETAMINOPHEN 325 MG/1
650 TABLET ORAL ONCE
Status: COMPLETED | OUTPATIENT
Start: 2023-12-23 | End: 2023-12-23

## 2023-12-23 RX ORDER — LIDOCAINE 50 MG/G
1 PATCH TOPICAL ONCE
Status: DISCONTINUED | OUTPATIENT
Start: 2023-12-23 | End: 2023-12-23 | Stop reason: HOSPADM

## 2023-12-23 RX ORDER — SENNOSIDES 8.6 MG
650 CAPSULE ORAL EVERY 8 HOURS PRN
Qty: 30 TABLET | Refills: 0 | Status: SHIPPED | OUTPATIENT
Start: 2023-12-23

## 2023-12-23 RX ADMIN — LIDOCAINE 1 PATCH: 700 PATCH TOPICAL at 15:26

## 2023-12-23 RX ADMIN — ACETAMINOPHEN 650 MG: 325 TABLET ORAL at 15:26

## 2023-12-23 RX ADMIN — Medication 1 APPLICATION: at 16:27

## 2023-12-23 NOTE — ED PROVIDER NOTES
"History  Chief Complaint   Patient presents with    Abdominal Pain     Pt with abd pain to left upper flank x 1 week, denies n/v/d, reports also upper tooth pain since yesterday      38-year-old female past medical history of von Willebrand disease, IVDA, asthma, anxiety presents to emergency department complaining of left upper quadrant pain intermittently for 1 week.      History provided by:  Patient  Abdominal Pain  Pain location:  LUQ  Pain quality: sharp    Pain radiates to:  Does not radiate  Duration:  1 week  Timing:  Intermittent  Chronicity:  New  Relieved by:  None tried  Worsened by:  Palpation and movement  Ineffective treatments:  None tried  Associated symptoms: no anorexia, no chest pain, no chills, no constipation, no cough, no diarrhea, no dysuria, no fever, no hematemesis, no hematochezia, no hematuria, no melena, no nausea, no shortness of breath, no sore throat and no vomiting    Dental Pain  Location:  Upper  Upper teeth location:  9/JERICA central incisor  Context: dental caries, dental fracture (\"part of tooth fell off\") and poor dentition    Associated symptoms: no congestion, no difficulty swallowing, no drooling, no facial pain, no facial swelling, no fever, no gum swelling, no headaches, no neck pain, no neck swelling, no oral bleeding, no oral lesions and no trismus        Prior to Admission Medications   Prescriptions Last Dose Informant Patient Reported? Taking?   albuterol (PROVENTIL HFA,VENTOLIN HFA) 90 mcg/act inhaler   Yes No   Sig: INHALE 2 PUFFS EVERY 4 HOURS BY INHALATION ROUTE AS NEEDED.   cloNIDine (CATAPRES) 0.2 mg tablet   Yes No   Sig: Take 0.2 mg by mouth daily at bedtime   clonazePAM (KlonoPIN) 1 mg tablet  Self Yes No   Sig: Take 1 mg by mouth daily      Facility-Administered Medications: None       Past Medical History:   Diagnosis Date    Anxiety     Asthma     Elevated LFTs 4/20/2021    Back to normal levels in July 2021.    Intravenous drug abuse (HCC)     Von " Willebrand disease (HCC)        Past Surgical History:   Procedure Laterality Date    APPENDECTOMY      CHOLECYSTECTOMY      PARTIAL HYSTERECTOMY         History reviewed. No pertinent family history.  I have reviewed and agree with the history as documented.    E-Cigarette/Vaping    E-Cigarette Use Never User      E-Cigarette/Vaping Substances     Social History     Tobacco Use    Smoking status: Some Days     Current packs/day: 0.50     Types: Cigarettes    Smokeless tobacco: Never    Tobacco comments:     occasional   Vaping Use    Vaping status: Never Used   Substance Use Topics    Alcohol use: Never    Drug use: Not Currently     Types: Heroin     Comment: former       Review of Systems   Constitutional:  Negative for chills and fever.   HENT:  Negative for congestion, drooling, facial swelling, mouth sores and sore throat.    Respiratory:  Negative for cough and shortness of breath.    Cardiovascular:  Negative for chest pain.   Gastrointestinal:  Positive for abdominal pain. Negative for abdominal distention, anorexia, blood in stool, constipation, diarrhea, hematemesis, hematochezia, melena, nausea and vomiting.   Genitourinary:  Negative for decreased urine volume, difficulty urinating, dysuria, frequency, hematuria and urgency.   Musculoskeletal:  Negative for back pain and neck pain.   Skin:  Negative for color change and rash.   Neurological:  Negative for headaches.   All other systems reviewed and are negative.      Physical Exam  Physical Exam  Vitals and nursing note reviewed.   Constitutional:       General: She is awake. She is not in acute distress.     Appearance: Normal appearance. She is well-developed. She is not ill-appearing, toxic-appearing or diaphoretic.   HENT:      Head: Normocephalic and atraumatic.      Jaw: There is normal jaw occlusion. No trismus, tenderness, swelling or malocclusion.      Right Ear: Tympanic membrane, ear canal and external ear normal.      Left Ear: Tympanic  membrane, ear canal and external ear normal.      Nose: Nose normal.      Mouth/Throat:      Lips: Pink.      Mouth: Mucous membranes are moist. No oral lesions or angioedema.      Dentition: Abnormal dentition. Dental caries present. No gingival swelling, dental abscesses or gum lesions.      Pharynx: Oropharynx is clear. Uvula midline. No pharyngeal swelling, oropharyngeal exudate, posterior oropharyngeal erythema or uvula swelling.      Tonsils: No tonsillar exudate or tonsillar abscesses.     Eyes:      General: Vision grossly intact.         Right eye: No discharge.         Left eye: No discharge.      Extraocular Movements: Extraocular movements intact.      Conjunctiva/sclera: Conjunctivae normal.      Pupils: Pupils are equal, round, and reactive to light.   Neck:      Trachea: Phonation normal.      Comments: Patient maintaining airway and secretions. No stridor . No brawniness under tongue.       Cardiovascular:      Rate and Rhythm: Normal rate and regular rhythm.      Heart sounds: Normal heart sounds.   Pulmonary:      Effort: Pulmonary effort is normal. No respiratory distress.      Breath sounds: Normal breath sounds. No stridor.      Comments: Patient in no respiratory distress, speaking in full sentences, managing oral secretions without difficulty, no accessory muscle use, retractions, or belly breathing noted, no adventitious lung sounds auscultated bilaterally.      Abdominal:      General: There is no distension.      Palpations: Abdomen is soft.      Tenderness: There is abdominal tenderness in the left upper quadrant. There is no right CVA tenderness or left CVA tenderness.      Comments: Mild TTP of musculature surrounding LUQ but not tender on deep palpation.    Musculoskeletal:      Cervical back: Full passive range of motion without pain.   Lymphadenopathy:      Cervical: No cervical adenopathy.   Skin:     General: Skin is warm and dry.      Capillary Refill: Capillary refill takes less  than 2 seconds.      Findings: No erythema or rash.   Neurological:      Mental Status: She is alert.   Psychiatric:         Behavior: Behavior is cooperative.         Vital Signs  ED Triage Vitals [12/23/23 1444]   Temperature Pulse Respirations Blood Pressure SpO2   98.1 °F (36.7 °C) 82 20 136/85 97 %      Temp Source Heart Rate Source Patient Position - Orthostatic VS BP Location FiO2 (%)   Oral Monitor Sitting Right arm --      Pain Score       --           Vitals:    12/23/23 1444   BP: 136/85   Pulse: 82   Patient Position - Orthostatic VS: Sitting         Visual Acuity      ED Medications  Medications   acetaminophen (TYLENOL) tablet 650 mg (650 mg Oral Given 12/23/23 1526)   benzocaine (HURRICAINE) 20 % mucosal gel 1 Application (1 Application Mucosal Given by Other 12/23/23 1627)       Diagnostic Studies  Results Reviewed       Procedure Component Value Units Date/Time    Urine Microscopic [[id number]]  (Abnormal) Collected: 12/23/23 1650    Lab Status: Final result Specimen: Urine, Clean Catch Updated: 12/23/23 1728     RBC, UA 0-1 /hpf      WBC, UA 1-2 /hpf      Epithelial Cells Occasional /hpf      Bacteria, UA Moderate /hpf     UA w Reflex to Microscopic w Reflex to Culture [[id number]]  (Abnormal) Collected: 12/23/23 1650    Lab Status: Final result Specimen: Urine, Clean Catch Updated: 12/23/23 1721     Color, UA Yellow     Clarity, UA Cloudy     Specific Gravity, UA 1.030     pH, UA 5.0     Leukocytes, UA 25.0     Nitrite, UA Negative     Protein, UA 15 (Trace) mg/dl      Glucose, UA Negative mg/dl      Ketones, UA Negative mg/dl      Bilirubin, UA Negative     Occult Blood, UA 10.0     UROBILINOGEN UA Negative mg/dL     POCT pregnancy, urine [[id number]]  (Normal) Resulted: 12/23/23 1653    Lab Status: Final result Updated: 12/23/23 1653     EXT Preg Test, Ur Negative     Control Valid    Lipase [[id number]]  (Normal) Collected: 12/23/23 1524    Lab Status: Final result Specimen: Blood from Arm, Left  Updated: 12/23/23 1601     Lipase 31 u/L     Comprehensive metabolic panel [[id number]]  (Abnormal) Collected: 12/23/23 1524    Lab Status: Final result Specimen: Blood from Arm, Left Updated: 12/23/23 1601     Sodium 138 mmol/L      Potassium 5.4 mmol/L      Chloride 103 mmol/L      CO2 28 mmol/L      ANION GAP 7 mmol/L      BUN 13 mg/dL      Creatinine 0.80 mg/dL      Glucose 115 mg/dL      Calcium 9.5 mg/dL      AST 38 U/L      ALT 17 U/L      Alkaline Phosphatase 63 U/L      Total Protein 7.2 g/dL      Albumin 3.9 g/dL      Total Bilirubin 0.50 mg/dL      eGFR 92 ml/min/1.73sq m     Narrative:      National Kid[employer] guidelines for Chronic Kidney Disease (CKD):     Stage 1 with normal or high GFR (GFR > 90 mL/min/1.73 square meters)    Stage 2 Mild CKD (GFR = 60-89 mL/min/1.73 square meters)    Stage 3A Moderate CKD (GFR = 45-59 mL/min/1.73 square meters)    Stage 3B Moderate CKD (GFR = 30-44 mL/min/1.73 square meters)    Stage 4 Severe CKD (GFR = 15-29 mL/min/1.73 square meters)    Stage 5 End Stage CKD (GFR <15 mL/min/1.73 square meters)  Note: GFR calculation is accurate only with a steady state creatinine    CBC and differential [[id number]]  (Abnormal) Collected: 12/23/23 1524    Lab Status: Final result Specimen: Blood from Arm, Left Updated: 12/23/23 1534     WBC 11.20 Thousand/uL      RBC 5.20 Million/uL      Hemoglobin 14.7 g/dL      Hematocrit 45.2 %      MCV 87 fL      MCH 28.3 pg      MCHC 32.5 g/dL      RDW 12.6 %      MPV 9.9 fL      Platelets 276 Thousands/uL      nRBC 0 /100 WBCs      Neutrophils Relative 54 %      Immat GRANS % 1 %      Lymphocytes Relative 36 %      Monocytes Relative 6 %      Eosinophils Relative 3 %      Basophils Relative 0 %      Neutrophils Absolute 6.13 Thousands/µL      Immature Grans Absolute 0.07 Thousand/uL      Lymphocytes Absolute 4.01 Thousands/µL      Monocytes Absolute 0.65 Thousand/µL      Eosinophils Absolute 0.30 Thousand/µL      Basophils  Absolute 0.04 Thousands/µL                    XR chest 2 views   ED Interpretation by Laura Turpin PA-C (12/23 1557)   No acute cardiopulmonary disease       Final Result by Ayo Justice MD (12/24 1001)      No acute cardiopulmonary disease.                  Workstation performed: [id number]                    Procedures  Procedures         ED Course  ED Course as of 12/24/23 1228   Sat Dec 23, 2023   1540 Per ED tech, ecg was performed immediate after patient returned from the bathroom, walk from other end of department. No change in symptoms.    1541 Procedure Note: EKG  Date/Time: 12/23/23 3:41 PM   Performed by: Laura Turpin   Authorized by: Laura Turpin  ECG interpreted by me, the ED Provider: yes   The EKG demonstrates:  Rate 107 bpm  Rhythm sinus tachycardia   QTc 488 ms   No ST elevations/depressions     1557 Imaging review done by myself and preliminary results were discussed with the patient and family members.  Discussion was had with patient and family members that this read is preliminary and therefore discrepancies between this read and the final read by the radiologist are possible.  Patient and family members were informed that any discrepancies found by would be relayed by phone call.        1604 Potassium(!): 5.4  Hemolyzed, causing falsely elevated potassium. No symptoms or ECG changes concerning for hyperkalemia.                        PERC Rule for PE      Flowsheet Row Most Recent Value   PERC Rule for PE    Age >=50 0 Filed at: 12/23/2023 1517   HR >=100 0 Filed at: 12/23/2023 1517   O2 Sat on room air < 95% 0 Filed at: 12/23/2023 1517   History of PE or DVT 0 Filed at: 12/23/2023 1517   Recent trauma or surgery 0 Filed at: 12/23/2023 1517   Hemoptysis 0 Filed at: 12/23/2023 1517   Exogenous estrogen 0 Filed at: 12/23/2023 1517   Unilateral leg swelling 0 Filed at: 12/23/2023 1517   PERC Rule for PE Results 0 Filed at: 12/23/2023 1517                SBIRT 20yo+      Flowsheet Row Most Recent  Value   Initial Alcohol Screen: US AUDIT-C     1. How often do you have a drink containing alcohol? 0 Filed at: 12/23/2023 1510   2. How many drinks containing alcohol do you have on a typical day you are drinking?  0 Filed at: 12/23/2023 1510   3b. FEMALE Any Age, or MALE 65+: How often do you have 4 or more drinks on one occassion? 0 Filed at: 12/23/2023 1510   Audit-C Score 0 Filed at: 12/23/2023 1510   DAVID: How many times in the past year have you...    Used an illegal drug or used a prescription medication for non-medical reasons? Never Filed at: 12/23/2023 1510            Wells' Criteria for PE      Flowsheet Row Most Recent Value   Wells' Criteria for PE    Clinical signs and symptoms of DVT 0 Filed at: 12/23/2023 1517   PE is primary diagnosis or equally likely 0 Filed at: 12/23/2023 1517   HR >100 0 Filed at: 12/23/2023 1517   Immobilization at least 3 days or Surgery in the previous 4 weeks 0 Filed at: 12/23/2023 1517   Previous, objectively diagnosed PE or DVT 0 Filed at: 12/23/2023 1517   Hemoptysis 0 Filed at: 12/23/2023 1517   Malignancy with treatment within 6 months or palliative 0 Filed at: 12/23/2023 1517   Wells' Criteria Total 0 Filed at: 12/23/2023 1517                  Medical Decision Making  LUQ pain x 1 week. Ddx includes but is not limited to pancreatitis, gastritis, pna, PE, nephrolithiasis, pyelonephritis, muscle strain. VSS. Afebrile. No acute respiratory distress.  PERC negative, further workup for PE indicated at this time including labs, imaging.  Mild leukocytosis, less than 12.  Normal hemoglobin.  CMP grossly nonpathologic.  Lipase is within normal limits.  UA findings not consistent with UTI. 0-1 rbc, pain not in flank, low clinical suspicion for nephrolithiasis at this time. Low clinical suspicion for emergent intraabdominal pathology at this time. Can follow up with PCP.      dental caries noted, partial erosion to pulp, no evidence of acute infection. No overt indication for  "antibiotics. Plan for supportive care and dental follow up.     All imaging and/or lab testing discussed with patient, strict return to ED precautions discussed. Patient recommended to follow up promptly with appropriate outpatient provider. Patient and/or family members verbalizes understanding and agrees with plan. Patient and/or family members were given opportunity to ask questions, all questions were answered at this time. Patient is stable for discharge.     Portions of the record may have been created with voice recognition software. Occasional wrong word or \"sound a like\" substitutions may have occurred due to the inherent limitations of voice recognition software. Read the chart carefully and recognize, using context, where substitutions have occurred.       Amount and/or Complexity of Data Reviewed  Labs: ordered. Decision-making details documented in ED Course.  Radiology: ordered and independent interpretation performed.    Risk  OTC drugs.  Prescription drug management.             Disposition  Final diagnoses:   Left upper quadrant abdominal pain   Pain due to dental caries     Time reflects when diagnosis was documented in both MDM as applicable and the Disposition within this note       Time User Action Codes Description Comment    12/23/2023  5:40 PM Laura Turpin Add [R10.12] Left upper quadrant abdominal pain     12/23/2023  5:40 PM Laura Turpin Add [K02.9] Pain due to dental caries           ED Disposition       ED Disposition   Discharge    Condition   Stable    Date/Time   Sat Dec 23, 2023 1741    Comment   Jennifer Young discharge to home/self care.                   Follow-up Information       Follow up With Specialties Details Why Contact Info Additional Information    [facility] Schedule an appointment as soon as possible for a visit  For follow up regarding your symptoms [address] [place] [address], [address] " [id number] [phone] [facility], [address], [address], [place], [place], [id number] [phone]    [facility]   [address] [id number] [phone] [facility], [address] [place] [address], [place], [address], [phone]            Discharge Medication List as of 12/23/2023  5:41 PM        START taking these medications    Details   acetaminophen (TYLENOL) 650 mg CR tablet Take 1 tablet (650 mg total) by mouth every 8 (eight) hours as needed for mild pain, Starting Sat 12/23/2023, Normal      chlorhexidine (PERIDEX) 0.12 % solution Apply 15 mL to the mouth or throat 2 (two) times a day, Starting Sat 12/23/2023, Normal           CONTINUE these medications which have NOT CHANGED    Details   albuterol (PROVENTIL HFA,VENTOLIN HFA) 90 mcg/act inhaler INHALE 2 PUFFS EVERY 4 HOURS BY INHALATION ROUTE AS NEEDED., Historical Med      clonazePAM (KlonoPIN) 1 mg tablet Take 1 mg by mouth daily, Historical Med      cloNIDine (CATAPRES) 0.2 mg tablet Take 0.2 mg by mouth daily at bedtime, Historical Med             No discharge procedures on file.    PDMP Review         Value Time User    PDMP Reviewed  Yes 3/25/2021  1:16 PM Elier Torres            ED Provider  Electronically Signed by             Laura Turpin PA-C  12/24/23 5982

## 2023-12-23 NOTE — ED NOTES
Patient unable to provide urine specimen at this time. Provider informed.      Edouard Balbuena RN  12/23/23 5711

## 2024-01-21 ENCOUNTER — HOSPITAL ENCOUNTER (EMERGENCY)
Facility: HOSPITAL | Age: 39
Discharge: HOME | End: 2024-01-21
Attending: EMERGENCY MEDICINE
Payer: MEDICARE

## 2024-01-21 VITALS
DIASTOLIC BLOOD PRESSURE: 98 MMHG | WEIGHT: 206.7 LBS | HEART RATE: 119 BPM | SYSTOLIC BLOOD PRESSURE: 150 MMHG | BODY MASS INDEX: 37.81 KG/M2 | RESPIRATION RATE: 20 BRPM | OXYGEN SATURATION: 98 %

## 2024-01-21 DIAGNOSIS — G51.0 BELL'S PALSY: Primary | ICD-10-CM

## 2024-01-21 DIAGNOSIS — K04.7 DENTAL INFECTION: ICD-10-CM

## 2024-01-21 PROCEDURE — 99283 EMERGENCY DEPT VISIT LOW MDM: CPT

## 2024-01-21 PROCEDURE — 99284 EMERGENCY DEPT VISIT MOD MDM: CPT | Performed by: EMERGENCY MEDICINE

## 2024-01-21 RX ORDER — PREDNISONE 20 MG/1
60 TABLET ORAL ONCE
Status: COMPLETED | OUTPATIENT
Start: 2024-01-21 | End: 2024-01-21

## 2024-01-21 RX ORDER — AMOXICILLIN AND CLAVULANATE POTASSIUM 875; 125 MG/1; MG/1
1 TABLET, FILM COATED ORAL ONCE
Status: COMPLETED | OUTPATIENT
Start: 2024-01-21 | End: 2024-01-21

## 2024-01-21 RX ORDER — AMOXICILLIN AND CLAVULANATE POTASSIUM 875; 125 MG/1; MG/1
1 TABLET, FILM COATED ORAL EVERY 12 HOURS
Qty: 13 TABLET | Refills: 0 | Status: SHIPPED | OUTPATIENT
Start: 2024-01-21 | End: 2024-01-21

## 2024-01-21 RX ORDER — PREDNISONE 20 MG/1
60 TABLET ORAL DAILY
Qty: 21 TABLET | Refills: 0 | Status: SHIPPED | OUTPATIENT
Start: 2024-01-21 | End: 2024-01-28

## 2024-01-21 RX ORDER — PREDNISONE 20 MG/1
60 TABLET ORAL DAILY
Qty: 21 TABLET | Refills: 0 | Status: SHIPPED | OUTPATIENT
Start: 2024-01-21 | End: 2024-01-21

## 2024-01-21 RX ORDER — AMOXICILLIN AND CLAVULANATE POTASSIUM 875; 125 MG/1; MG/1
1 TABLET, FILM COATED ORAL EVERY 12 HOURS
Qty: 13 TABLET | Refills: 0 | Status: SHIPPED | OUTPATIENT
Start: 2024-01-21 | End: 2024-01-28

## 2024-01-21 RX ORDER — POLYVINYL ALCOHOL 14 MG/ML
1 SOLUTION/ DROPS OPHTHALMIC AS NEEDED
Qty: 30 ML | Refills: 0 | Status: SHIPPED | OUTPATIENT
Start: 2024-01-21 | End: 2024-01-21

## 2024-01-21 RX ORDER — POLYVINYL ALCOHOL 14 MG/ML
1 SOLUTION/ DROPS OPHTHALMIC AS NEEDED
Qty: 30 ML | Refills: 0 | Status: SHIPPED | OUTPATIENT
Start: 2024-01-21 | End: 2024-02-20

## 2024-01-21 RX ADMIN — AMOXICILLIN AND CLAVULANATE POTASSIUM 1 TABLET: 875; 125 TABLET, FILM COATED ORAL at 12:55

## 2024-01-21 RX ADMIN — PREDNISONE 60 MG: 20 TABLET ORAL at 12:40

## 2024-01-21 NOTE — ED PROVIDER NOTES
History  Chief Complaint   Patient presents with    Facial Numbness     Patient had a dental procedure Monday and Wednesday noticed facial numbness and dropping; provider at bedside     38-year-old female history of anxiety, asthma, von Willebrand disease who presents to the emergency department for left-sided facial weakness x 4 days.  Patient states 6 days ago she had a dental procedure and and then 4 days ago developed facial paralysis symptoms.  Patient states she is unable to close her left eye raise her left eyebrow or smile with the left side of her face.  Patient denies fever, chills, chest pain, shortness of breath, nausea, vomiting, abdominal pain, headache, neck pain, tick exposure, pets at home, hiking or being outside in the woods.  Patient states she did have cold-like symptoms 2 weeks ago.      History provided by:  Patient      Prior to Admission Medications   Prescriptions Last Dose Informant Patient Reported? Taking?   acetaminophen (TYLENOL) 650 mg CR tablet   No No   Sig: Take 1 tablet (650 mg total) by mouth every 8 (eight) hours as needed for mild pain   albuterol (PROVENTIL HFA,VENTOLIN HFA) 90 mcg/act inhaler   Yes No   Sig: INHALE 2 PUFFS EVERY 4 HOURS BY INHALATION ROUTE AS NEEDED.   chlorhexidine (PERIDEX) 0.12 % solution   No No   Sig: Apply 15 mL to the mouth or throat 2 (two) times a day   cloNIDine (CATAPRES) 0.2 mg tablet   Yes No   Sig: Take 0.2 mg by mouth daily at bedtime   clonazePAM (KlonoPIN) 1 mg tablet  Self Yes No   Sig: Take 1 mg by mouth daily      Facility-Administered Medications: None       Past Medical History:   Diagnosis Date    Anxiety     Asthma     Elevated LFTs 4/20/2021    Back to normal levels in July 2021.    Intravenous drug abuse (HCC)     Von Willebrand disease (HCC)        Past Surgical History:   Procedure Laterality Date    APPENDECTOMY      CHOLECYSTECTOMY      PARTIAL HYSTERECTOMY         History reviewed. No pertinent family history.  I have reviewed  and agree with the history as documented.    E-Cigarette/Vaping    E-Cigarette Use Never User      E-Cigarette/Vaping Substances     Social History     Tobacco Use    Smoking status: Some Days     Current packs/day: 0.50     Types: Cigarettes    Smokeless tobacco: Never    Tobacco comments:     occasional   Vaping Use    Vaping status: Never Used   Substance Use Topics    Alcohol use: Never    Drug use: Not Currently     Types: Heroin     Comment: former        Review of Systems   Constitutional:  Negative for chills and fever.   HENT:  Negative for ear pain and sore throat.    Eyes:  Negative for pain and visual disturbance.   Respiratory:  Negative for cough and shortness of breath.    Cardiovascular:  Negative for chest pain and palpitations.   Gastrointestinal:  Negative for abdominal pain, constipation, diarrhea, nausea and vomiting.   Genitourinary:  Negative for dysuria and hematuria.   Musculoskeletal:  Negative for arthralgias and back pain.   Skin:  Negative for color change and rash.   Neurological:  Positive for facial asymmetry. Negative for seizures, syncope, light-headedness and headaches.   All other systems reviewed and are negative.      Physical Exam  ED Triage Vitals [01/21/24 1226]   Temp Pulse Respirations Blood Pressure SpO2   -- (!) 119 20 150/98 98 %      Temp src Heart Rate Source Patient Position - Orthostatic VS BP Location FiO2 (%)   -- -- -- -- --      Pain Score       --             Orthostatic Vital Signs  Vitals:    01/21/24 1226   BP: 150/98   Pulse: (!) 119       Physical Exam  Vitals and nursing note reviewed.   Constitutional:       General: She is not in acute distress.     Appearance: She is well-developed.   HENT:      Head: Normocephalic and atraumatic.      Mouth/Throat:      Mouth: Mucous membranes are moist.   Eyes:      Conjunctiva/sclera: Conjunctivae normal.   Cardiovascular:      Rate and Rhythm: Normal rate and regular rhythm.      Heart sounds: No murmur  heard.  Pulmonary:      Effort: Pulmonary effort is normal. No respiratory distress.      Breath sounds: Normal breath sounds.   Abdominal:      Palpations: Abdomen is soft.      Tenderness: There is no abdominal tenderness.   Musculoskeletal:         General: No swelling.      Cervical back: Neck supple.   Skin:     General: Skin is warm and dry.      Capillary Refill: Capillary refill takes less than 2 seconds.   Neurological:      Mental Status: She is alert and oriented to person, place, and time.      Sensory: Sensation is intact.      Comments: Cranial nerve VII palsy, does not spare forehead.  Patient unable to raise eyebrows, close eye, wrinkle forehead on the left.  Associated left facial droop.  No upper or lower extremity weakness.  No other cranial nerve deficits.     Psychiatric:         Mood and Affect: Mood normal.         ED Medications  Medications   predniSONE tablet 60 mg (60 mg Oral Given 1/21/24 1240)   amoxicillin-clavulanate (AUGMENTIN) 875-125 mg per tablet 1 tablet (1 tablet Oral Given 1/21/24 1255)       Diagnostic Studies  Results Reviewed       None                   No orders to display         Procedures  Procedures      ED Course                             SBIRT 22yo+      Flowsheet Row Most Recent Value   Initial Alcohol Screen: US AUDIT-C     1. How often do you have a drink containing alcohol? 0 Filed at: 01/21/2024 1228   2. How many drinks containing alcohol do you have on a typical day you are drinking?  0 Filed at: 01/21/2024 1228   3a. Male UNDER 65: How often do you have five or more drinks on one occasion? 0 Filed at: 01/21/2024 1228   3b. FEMALE Any Age, or MALE 65+: How often do you have 4 or more drinks on one occassion? 0 Filed at: 01/21/2024 1228   Audit-C Score 0 Filed at: 01/21/2024 1228   DAVID: How many times in the past year have you...    Used an illegal drug or used a prescription medication for non-medical reasons? Never Filed at: 01/21/2024 1228                   Medical Decision Making  37 yo F presented to ED for L facial weakness. Associated symptoms: No associated headache, fever, chills, chest pain, shortness of breath, nausea, vomiting, upper or lower extremity weakness, vision changes, speech changes. Exam findings: Left-sided facial droop, unable to raise left eyebrow or close left eye.  The rest of cranial nerves II through XII are intact.  Strength sensation intact to bilateral upper and lower extremities.  Differentials diagnoses considered: CVA versus Bell's palsy. Patient was given steroids for facial palsy.  Was also given antibiotics for dental infection.  Based on H&P, symptoms are consistent with Bell's palsy given that patient is unable to raise eyebrow on the left and unable to close left eyelid.  Patient also endorses that she had recent viral upper respiratory illness. Clinical impressions were discussed with patient and family. They expressed understanding. Plan: Discharged home with prescription for steroids, antibiotics, artificial tears; referral given for ophthalmology, instructed to follow-up with primary care doctor, instructed to return the emergency department for any new or worsening symptoms.  This plan was also discussed with patient, who was agreeable with this plan. Patient was given the opportunity to ask questions in ED. All questions and concerns were addressed in ED.     Risk  Prescription drug management.          Disposition  Final diagnoses:   Bell's palsy   Dental infection     Time reflects when diagnosis was documented in both MDM as applicable and the Disposition within this note       Time User Action Codes Description Comment    1/21/2024 12:40 PM Karen Craft Add [G51.0] Bell's palsy     1/21/2024 12:47 PM Karen Craft Add [K04.7] Dental infection           ED Disposition       ED Disposition   Discharge    Condition   Stable    Date/Time   Sun Jan 21, 2024 1251    Comment   Jennifer Young discharge to  home/self care.                   Follow-up Information       Follow up With Specialties Details Why Contact Info Additional Information    Jason Mccarthy MD Ophthalmology Call in 1 day As needed [address] [facility]  [address] [place] [id number]  [phone]       [facility] Emergency Department Emergency Medicine Go to  As needed, If symptoms worsen [address] [facility] [id number] [phone] [facility] Emergency Department    Your Primary Care Doctor  Call today let them know you were evaluated in the ER and would like to schedule a follow-up appointment              Discharge Medication List as of 1/21/2024 12:52 PM        START taking these medications    Details   amoxicillin-clavulanate (AUGMENTIN) 875-125 mg per tablet Take 1 tablet by mouth every 12 (twelve) hours for 7 days, Starting Sun 1/21/2024, Until Sun 1/28/2024, Normal      polyvinyl alcohol (LIQUIFILM TEARS) 1.4 % ophthalmic solution Administer 1 drop into the left eye as needed for dry eyes, Starting Sun 1/21/2024, Until Tue 2/20/2024 at 2359, Normal      predniSONE 20 mg tablet Take 3 tablets (60 mg total) by mouth daily for 7 days, Starting Sun 1/21/2024, Until Sun 1/28/2024, Normal           CONTINUE these medications which have NOT CHANGED    Details   acetaminophen (TYLENOL) 650 mg CR tablet Take 1 tablet (650 mg total) by mouth every 8 (eight) hours as needed for mild pain, Starting Sat 12/23/2023, Normal      albuterol (PROVENTIL HFA,VENTOLIN HFA) 90 mcg/act inhaler INHALE 2 PUFFS EVERY 4 HOURS BY INHALATION ROUTE AS NEEDED., Historical Med      chlorhexidine (PERIDEX) 0.12 % solution Apply 15 mL to the mouth or throat 2 (two) times a day, Starting Sat 12/23/2023, Normal      clonazePAM (KlonoPIN) 1 mg tablet Take 1 mg by mouth daily, Historical Med      cloNIDine (CATAPRES) 0.2 mg tablet Take 0.2 mg by mouth daily at bedtime, Historical Med           No discharge  procedures on file.    PDMP Review         Value Time User    PDMP Reviewed  Yes 3/25/2021  1:16 PM Elier Torres             ED Provider  Attending physically available and evaluated Jennifer Young. I managed the patient along with the ED Attending.    Electronically Signed by           Karen Craft DO  01/21/24 6104

## 2024-01-21 NOTE — ED ATTENDING ATTESTATION
1/21/2024  IMacario MD, saw and evaluated the patient. I have discussed the patient with the resident/non-physician practitioner and agree with the resident's/non-physician practitioner's findings, Plan of Care, and MDM as documented in the resident's/non-physician practitioner's note, except where noted. All available labs and Radiology studies were reviewed.  I was present for key portions of any procedure(s) performed by the resident/non-physician practitioner and I was immediately available to provide assistance.       At this point I agree with the current assessment done in the Emergency Department.  I have conducted an independent evaluation of this patient a history and physical is as follows:    ED Course     Patient is a 38-year-old female presenting with left-sided facial weakness.  Patient symptoms started since Wednesday.  States that she had a dental procedure on Monday where she had numbing medication injected into the left side.  Since then she has been having increased drooling as well as difficulty closing her eyes.  Patient denies any hiking or going into the woods.  Also reports no recent illness.  No sensation change.  Only reports weakness.    Exam.  Patient with left-sided facial weakness with no forehead sparing.  States that she was intact and no other focal neurologic deficits.    Plan: Patient with left-sided facial weakness with no forehead sparing consistent with Bell's palsy.  Possibly iatrogenic versus viral cause.  Will give instructions for Bell's palsy management as well as steroids and ophthalmology follow-up.  Patient denies any HSV history and no need for antiviral.  Patient also low likelihood of Lyme disease will not prescribe doxycycline.    Critical Care Time  Procedures

## 2024-02-07 ENCOUNTER — TELEPHONE (OUTPATIENT)
Dept: FAMILY MEDICINE CLINIC | Facility: CLINIC | Age: 39
End: 2024-02-07

## 2024-02-07 NOTE — TELEPHONE ENCOUNTER
Jennifer GORE  [facility]1 hour ago (3:37 PM)       Appointment Request From: Jennifer Young     With Provider: Brian Urias MD [[facility]]     Preferred Date Range: 2/15/2024 - 2/16/2024     Preferred Times: Monday Afternoon, Tuesday Afternoon, Wednesday Afternoon, Thursday Afternoon, Friday Afternoon     Reason for visit: Primary Care Office Visit     Comments:  Follow  up and was in the er for bell palsy

## 2024-02-08 NOTE — TELEPHONE ENCOUNTER
second attempt to contact patient. left message to return my call on answering machine. Letter sent

## 2024-03-15 ENCOUNTER — TELEPHONE (OUTPATIENT)
Dept: FAMILY MEDICINE CLINIC | Facility: CLINIC | Age: 39
End: 2024-03-15

## 2024-03-15 NOTE — TELEPHONE ENCOUNTER
Jennifer GORE [facility]5 hours ago (11:04 AM)       Can u call me to make a appointment  with Dr essie urias my number is [phone]  for a follow up thank u or text me       Jennifer GORE [facility]1 month ago       Appointment Request From: Jennifer Young     With Provider: Essie Urias MD [[facility]]     Preferred Date Range: 2/12/2024 - 2/12/2024     Preferred Times: Monday Afternoon, Tuesday Afternoon, Wednesday Afternoon, Thursday Afternoon, Friday Afternoon     Reason for visit: Primary Care Office Visit     Comments:  Follow up and bell palsy

## 2024-03-27 ENCOUNTER — TELEPHONE (OUTPATIENT)
Dept: FAMILY MEDICINE CLINIC | Facility: CLINIC | Age: 39
End: 2024-03-27

## 2024-03-27 NOTE — TELEPHONE ENCOUNTER
03/27/24    CALLED PT AND NO ANSWER    LEFT DETAILED MESSAGE       IF PT CONTACT OFFICE, PLEASE ASSIST PT WITH DONALD NP 03/26/24 APPT    PLEASE RESHMA PT APPT FOR A 40 MIN VISIT    PT HAS NOT BEEN SEEN SINCE 2021 AND WILL BE RE-ESTABLISHING CARE AND IS AN ED F/U.      ADDITIONALLY, IF PT CONTACT OFFICE PAST  04/22/21 SHE WILL BE ON THE BUTTON OF 3 YEARS.       NOTE: LETTER TO DONALD HAS BEEN SENT